# Patient Record
Sex: MALE | Race: WHITE | NOT HISPANIC OR LATINO | Employment: FULL TIME | URBAN - METROPOLITAN AREA
[De-identification: names, ages, dates, MRNs, and addresses within clinical notes are randomized per-mention and may not be internally consistent; named-entity substitution may affect disease eponyms.]

---

## 2020-02-13 ENCOUNTER — APPOINTMENT (EMERGENCY)
Dept: RADIOLOGY | Facility: HOSPITAL | Age: 45
DRG: 494 | End: 2020-02-13
Payer: COMMERCIAL

## 2020-02-13 ENCOUNTER — APPOINTMENT (EMERGENCY)
Dept: CT IMAGING | Facility: HOSPITAL | Age: 45
DRG: 494 | End: 2020-02-13
Payer: COMMERCIAL

## 2020-02-13 ENCOUNTER — HOSPITAL ENCOUNTER (INPATIENT)
Facility: HOSPITAL | Age: 45
LOS: 4 days | Discharge: HOME | DRG: 494 | End: 2020-02-17
Attending: EMERGENCY MEDICINE | Admitting: INTERNAL MEDICINE
Payer: COMMERCIAL

## 2020-02-13 DIAGNOSIS — S82.202A CLOSED FRACTURE OF LEFT TIBIA AND FIBULA, INITIAL ENCOUNTER: ICD-10-CM

## 2020-02-13 DIAGNOSIS — S82.402A CLOSED FRACTURE OF LEFT TIBIA AND FIBULA, INITIAL ENCOUNTER: ICD-10-CM

## 2020-02-13 DIAGNOSIS — R52 INTRACTABLE PAIN: ICD-10-CM

## 2020-02-13 DIAGNOSIS — S82.202A CLOSED FRACTURE OF SHAFT OF TIBIA AND FIBULA, LEFT, INITIAL ENCOUNTER: Primary | ICD-10-CM

## 2020-02-13 DIAGNOSIS — S82.402A CLOSED FRACTURE OF SHAFT OF TIBIA AND FIBULA, LEFT, INITIAL ENCOUNTER: Primary | ICD-10-CM

## 2020-02-13 PROBLEM — S82.401A CLOSED FRACTURE OF RIGHT TIBIA AND FIBULA: Status: ACTIVE | Noted: 2020-02-13

## 2020-02-13 PROBLEM — S82.201A CLOSED FRACTURE OF RIGHT TIBIA AND FIBULA: Status: ACTIVE | Noted: 2020-02-13

## 2020-02-13 PROBLEM — E03.9 HYPOTHYROIDISM: Status: ACTIVE | Noted: 2020-02-13

## 2020-02-13 PROBLEM — K21.9 GASTROESOPHAGEAL REFLUX DISEASE WITHOUT ESOPHAGITIS: Status: ACTIVE | Noted: 2020-02-13

## 2020-02-13 LAB
BASOPHILS # BLD AUTO: 0.03 THOUSANDS/ΜL (ref 0–0.1)
BASOPHILS NFR BLD AUTO: 0 % (ref 0–1)
EOSINOPHIL # BLD AUTO: 0.06 THOUSAND/ΜL (ref 0–0.61)
EOSINOPHIL NFR BLD AUTO: 1 % (ref 0–6)
ERYTHROCYTE [DISTWIDTH] IN BLOOD BY AUTOMATED COUNT: 14.8 % (ref 11.6–15.1)
HCT VFR BLD AUTO: 46.2 % (ref 36.5–49.3)
HGB BLD-MCNC: 15 G/DL (ref 12–17)
IMM GRANULOCYTES # BLD AUTO: 0.05 THOUSAND/UL (ref 0–0.2)
IMM GRANULOCYTES NFR BLD AUTO: 0 % (ref 0–2)
LYMPHOCYTES # BLD AUTO: 1.53 THOUSANDS/ΜL (ref 0.6–4.47)
LYMPHOCYTES NFR BLD AUTO: 12 % (ref 14–44)
MCH RBC QN AUTO: 28.4 PG (ref 26.8–34.3)
MCHC RBC AUTO-ENTMCNC: 32.5 G/DL (ref 31.4–37.4)
MCV RBC AUTO: 87 FL (ref 82–98)
MONOCYTES # BLD AUTO: 0.77 THOUSAND/ΜL (ref 0.17–1.22)
MONOCYTES NFR BLD AUTO: 6 % (ref 4–12)
NEUTROPHILS # BLD AUTO: 10 THOUSANDS/ΜL (ref 1.85–7.62)
NEUTS SEG NFR BLD AUTO: 81 % (ref 43–75)
NRBC BLD AUTO-RTO: 0 /100 WBCS
PLATELET # BLD AUTO: 184 THOUSANDS/UL (ref 149–390)
PMV BLD AUTO: 11.7 FL (ref 8.9–12.7)
RBC # BLD AUTO: 5.29 MILLION/UL (ref 3.88–5.62)
WBC # BLD AUTO: 12.44 THOUSAND/UL (ref 4.31–10.16)

## 2020-02-13 PROCEDURE — 80048 BASIC METABOLIC PNL TOTAL CA: CPT | Performed by: EMERGENCY MEDICINE

## 2020-02-13 PROCEDURE — 86901 BLOOD TYPING SEROLOGIC RH(D): CPT | Performed by: EMERGENCY MEDICINE

## 2020-02-13 PROCEDURE — 84443 ASSAY THYROID STIM HORMONE: CPT | Performed by: INTERNAL MEDICINE

## 2020-02-13 PROCEDURE — 96374 THER/PROPH/DIAG INJ IV PUSH: CPT

## 2020-02-13 PROCEDURE — 99285 EMERGENCY DEPT VISIT HI MDM: CPT

## 2020-02-13 PROCEDURE — 99223 1ST HOSP IP/OBS HIGH 75: CPT | Performed by: INTERNAL MEDICINE

## 2020-02-13 PROCEDURE — 84439 ASSAY OF FREE THYROXINE: CPT | Performed by: INTERNAL MEDICINE

## 2020-02-13 PROCEDURE — 86850 RBC ANTIBODY SCREEN: CPT | Performed by: EMERGENCY MEDICINE

## 2020-02-13 PROCEDURE — 73700 CT LOWER EXTREMITY W/O DYE: CPT

## 2020-02-13 PROCEDURE — 29515 APPLICATION SHORT LEG SPLINT: CPT | Performed by: EMERGENCY MEDICINE

## 2020-02-13 PROCEDURE — 73590 X-RAY EXAM OF LOWER LEG: CPT

## 2020-02-13 PROCEDURE — 96375 TX/PRO/DX INJ NEW DRUG ADDON: CPT

## 2020-02-13 PROCEDURE — 96376 TX/PRO/DX INJ SAME DRUG ADON: CPT

## 2020-02-13 PROCEDURE — 86900 BLOOD TYPING SEROLOGIC ABO: CPT | Performed by: EMERGENCY MEDICINE

## 2020-02-13 PROCEDURE — 2W3RX1Z IMMOBILIZATION OF LEFT LOWER LEG USING SPLINT: ICD-10-PCS | Performed by: EMERGENCY MEDICINE

## 2020-02-13 PROCEDURE — 85025 COMPLETE CBC W/AUTO DIFF WBC: CPT | Performed by: EMERGENCY MEDICINE

## 2020-02-13 PROCEDURE — 99285 EMERGENCY DEPT VISIT HI MDM: CPT | Performed by: EMERGENCY MEDICINE

## 2020-02-13 PROCEDURE — 36415 COLL VENOUS BLD VENIPUNCTURE: CPT | Performed by: INTERNAL MEDICINE

## 2020-02-13 RX ORDER — HYDROMORPHONE HCL/PF 1 MG/ML
1 SYRINGE (ML) INJECTION ONCE
Status: COMPLETED | OUTPATIENT
Start: 2020-02-13 | End: 2020-02-13

## 2020-02-13 RX ORDER — ACETAMINOPHEN 325 MG/1
975 TABLET ORAL EVERY 8 HOURS SCHEDULED
Status: DISCONTINUED | OUTPATIENT
Start: 2020-02-13 | End: 2020-02-17 | Stop reason: HOSPADM

## 2020-02-13 RX ORDER — FENTANYL CITRATE 50 UG/ML
1 INJECTION, SOLUTION INTRAMUSCULAR; INTRAVENOUS ONCE
Status: COMPLETED | OUTPATIENT
Start: 2020-02-13 | End: 2020-02-13

## 2020-02-13 RX ORDER — LORAZEPAM 2 MG/ML
1 INJECTION INTRAMUSCULAR ONCE
Status: COMPLETED | OUTPATIENT
Start: 2020-02-13 | End: 2020-02-13

## 2020-02-13 RX ORDER — KETOROLAC TROMETHAMINE 30 MG/ML
30 INJECTION, SOLUTION INTRAMUSCULAR; INTRAVENOUS ONCE
Status: COMPLETED | OUTPATIENT
Start: 2020-02-13 | End: 2020-02-13

## 2020-02-13 RX ORDER — ASPIRIN 81 MG/1
81 TABLET ORAL DAILY
COMMUNITY
End: 2020-02-17 | Stop reason: HOSPADM

## 2020-02-13 RX ORDER — OMEPRAZOLE 20 MG/1
20 CAPSULE, DELAYED RELEASE ORAL DAILY
COMMUNITY

## 2020-02-13 RX ORDER — OXYCODONE HYDROCHLORIDE 5 MG/1
5 TABLET ORAL EVERY 4 HOURS PRN
Status: DISCONTINUED | OUTPATIENT
Start: 2020-02-13 | End: 2020-02-15

## 2020-02-13 RX ORDER — LEVOTHYROXINE SODIUM 0.05 MG/1
50 TABLET ORAL DAILY
COMMUNITY

## 2020-02-13 RX ORDER — OXYCODONE HYDROCHLORIDE 10 MG/1
10 TABLET ORAL EVERY 4 HOURS PRN
Status: DISCONTINUED | OUTPATIENT
Start: 2020-02-13 | End: 2020-02-15

## 2020-02-13 RX ORDER — HYDROMORPHONE HCL/PF 1 MG/ML
0.5 SYRINGE (ML) INJECTION
Status: DISCONTINUED | OUTPATIENT
Start: 2020-02-13 | End: 2020-02-15

## 2020-02-13 RX ADMIN — LORAZEPAM 1 MG: 2 INJECTION INTRAMUSCULAR; INTRAVENOUS at 22:10

## 2020-02-13 RX ADMIN — HYDROMORPHONE HYDROCHLORIDE 1 MG: 1 INJECTION, SOLUTION INTRAMUSCULAR; INTRAVENOUS; SUBCUTANEOUS at 21:46

## 2020-02-13 RX ADMIN — HYDROMORPHONE HYDROCHLORIDE 1 MG: 1 INJECTION, SOLUTION INTRAMUSCULAR; INTRAVENOUS; SUBCUTANEOUS at 21:11

## 2020-02-13 RX ADMIN — HYDROMORPHONE HYDROCHLORIDE 1 MG: 1 INJECTION, SOLUTION INTRAMUSCULAR; INTRAVENOUS; SUBCUTANEOUS at 22:23

## 2020-02-13 RX ADMIN — ACETAMINOPHEN 975 MG: 325 TABLET, FILM COATED ORAL at 23:25

## 2020-02-13 RX ADMIN — KETOROLAC TROMETHAMINE 30 MG: 30 INJECTION, SOLUTION INTRAMUSCULAR at 21:45

## 2020-02-13 NOTE — LETTER
[address] [address]  Dept: [phone]    February 16, 2020     Patient: Kian Cabrera   YOB: 1975   Date of Visit: 2/13/2020       To Whom it May Concern:    Bianca is under my professional care  He was seen in the hospital from 2/13/2020   to 02/17/20  He was accompanied by his wife, Magi Mireles, during this hospital stay due to severity of his injury, requiring additional help for care/treatment  If you have any questions or concerns, please don't hesitate to call           Sincerely,          Charis Fulton PA-C

## 2020-02-14 ENCOUNTER — ANESTHESIA EVENT (INPATIENT)
Dept: PERIOP | Facility: HOSPITAL | Age: 45
DRG: 494 | End: 2020-02-14
Payer: COMMERCIAL

## 2020-02-14 ENCOUNTER — ANESTHESIA (INPATIENT)
Dept: PERIOP | Facility: HOSPITAL | Age: 45
DRG: 494 | End: 2020-02-14
Payer: COMMERCIAL

## 2020-02-14 ENCOUNTER — APPOINTMENT (INPATIENT)
Dept: RADIOLOGY | Facility: HOSPITAL | Age: 45
DRG: 494 | End: 2020-02-14
Payer: COMMERCIAL

## 2020-02-14 PROBLEM — S82.202A: Status: ACTIVE | Noted: 2020-02-13

## 2020-02-14 PROBLEM — S82.402A: Status: ACTIVE | Noted: 2020-02-13

## 2020-02-14 PROBLEM — N18.30 STAGE 3 CHRONIC KIDNEY DISEASE (HCC): Status: ACTIVE | Noted: 2020-02-14

## 2020-02-14 LAB
ABO GROUP BLD: NORMAL
ABO GROUP BLD: NORMAL
ANION GAP SERPL CALCULATED.3IONS-SCNC: 3 MMOL/L (ref 4–13)
ANION GAP SERPL CALCULATED.3IONS-SCNC: 7 MMOL/L (ref 4–13)
BLD GP AB SCN SERPL QL: NEGATIVE
BUN SERPL-MCNC: 19 MG/DL (ref 5–25)
BUN SERPL-MCNC: 20 MG/DL (ref 5–25)
CALCIUM SERPL-MCNC: 8.6 MG/DL (ref 8.3–10.1)
CALCIUM SERPL-MCNC: 8.7 MG/DL (ref 8.3–10.1)
CHLORIDE SERPL-SCNC: 105 MMOL/L (ref 100–108)
CHLORIDE SERPL-SCNC: 108 MMOL/L (ref 100–108)
CO2 SERPL-SCNC: 31 MMOL/L (ref 21–32)
CO2 SERPL-SCNC: 32 MMOL/L (ref 21–32)
CREAT SERPL-MCNC: 1.62 MG/DL (ref 0.6–1.3)
CREAT SERPL-MCNC: 1.72 MG/DL (ref 0.6–1.3)
ERYTHROCYTE [DISTWIDTH] IN BLOOD BY AUTOMATED COUNT: 14.7 % (ref 11.6–15.1)
GFR SERPL CREATININE-BSD FRML MDRD: 47 ML/MIN/1.73SQ M
GFR SERPL CREATININE-BSD FRML MDRD: 50 ML/MIN/1.73SQ M
GLUCOSE SERPL-MCNC: 107 MG/DL (ref 65–140)
GLUCOSE SERPL-MCNC: 95 MG/DL (ref 65–140)
HCT VFR BLD AUTO: 43.7 % (ref 36.5–49.3)
HGB BLD-MCNC: 14 G/DL (ref 12–17)
MCH RBC QN AUTO: 28.3 PG (ref 26.8–34.3)
MCHC RBC AUTO-ENTMCNC: 32 G/DL (ref 31.4–37.4)
MCV RBC AUTO: 88 FL (ref 82–98)
PLATELET # BLD AUTO: 167 THOUSANDS/UL (ref 149–390)
PMV BLD AUTO: 11.9 FL (ref 8.9–12.7)
POTASSIUM SERPL-SCNC: 4.1 MMOL/L (ref 3.5–5.3)
POTASSIUM SERPL-SCNC: 4.2 MMOL/L (ref 3.5–5.3)
RBC # BLD AUTO: 4.95 MILLION/UL (ref 3.88–5.62)
RH BLD: POSITIVE
RH BLD: POSITIVE
SODIUM SERPL-SCNC: 143 MMOL/L (ref 136–145)
SODIUM SERPL-SCNC: 143 MMOL/L (ref 136–145)
SPECIMEN EXPIRATION DATE: NORMAL
T4 FREE SERPL-MCNC: 1.1 NG/DL (ref 0.76–1.46)
TSH SERPL DL<=0.05 MIU/L-ACNC: 6.11 UIU/ML (ref 0.36–3.74)
WBC # BLD AUTO: 9.82 THOUSAND/UL (ref 4.31–10.16)

## 2020-02-14 PROCEDURE — C1713 ANCHOR/SCREW BN/BN,TIS/BN: HCPCS | Performed by: ORTHOPAEDIC SURGERY

## 2020-02-14 PROCEDURE — 85027 COMPLETE CBC AUTOMATED: CPT | Performed by: INTERNAL MEDICINE

## 2020-02-14 PROCEDURE — 73590 X-RAY EXAM OF LOWER LEG: CPT

## 2020-02-14 PROCEDURE — 0QSH06Z REPOSITION LEFT TIBIA WITH INTRAMEDULLARY INTERNAL FIXATION DEVICE, OPEN APPROACH: ICD-10-PCS | Performed by: ORTHOPAEDIC SURGERY

## 2020-02-14 PROCEDURE — 27784 TREATMENT OF FIBULA FRACTURE: CPT | Performed by: ORTHOPAEDIC SURGERY

## 2020-02-14 PROCEDURE — 99255 IP/OBS CONSLTJ NEW/EST HI 80: CPT | Performed by: ORTHOPAEDIC SURGERY

## 2020-02-14 PROCEDURE — 27784 TREATMENT OF FIBULA FRACTURE: CPT | Performed by: PHYSICIAN ASSISTANT

## 2020-02-14 PROCEDURE — 27759 TREATMENT OF TIBIA FRACTURE: CPT | Performed by: PHYSICIAN ASSISTANT

## 2020-02-14 PROCEDURE — 36415 COLL VENOUS BLD VENIPUNCTURE: CPT | Performed by: INTERNAL MEDICINE

## 2020-02-14 PROCEDURE — 27759 TREATMENT OF TIBIA FRACTURE: CPT | Performed by: ORTHOPAEDIC SURGERY

## 2020-02-14 PROCEDURE — C1769 GUIDE WIRE: HCPCS | Performed by: ORTHOPAEDIC SURGERY

## 2020-02-14 PROCEDURE — 80048 BASIC METABOLIC PNL TOTAL CA: CPT | Performed by: INTERNAL MEDICINE

## 2020-02-14 PROCEDURE — 99232 SBSQ HOSP IP/OBS MODERATE 35: CPT | Performed by: PHYSICIAN ASSISTANT

## 2020-02-14 DEVICE — 4.0MM TI LOCKING SCREW W/T25 STARDRIVE 38MM F/IM NAILS-STER: Type: IMPLANTABLE DEVICE | Site: TIBIA | Status: FUNCTIONAL

## 2020-02-14 DEVICE — 4.0MM TI LOCKING SCREW W/T25 STARDRIVE 40MM F/IM NAILS-STER: Type: IMPLANTABLE DEVICE | Site: TIBIA | Status: FUNCTIONAL

## 2020-02-14 DEVICE — 9MM TI CANN TIBIAL NAIL-EX W/PROX BEND 345MM-STERILE
Type: IMPLANTABLE DEVICE | Site: TIBIA | Status: FUNCTIONAL
Brand: EXPERT NAIL

## 2020-02-14 DEVICE — 3.5MM CORTEX SCREW SELF-TAPPING 12MM: Type: IMPLANTABLE DEVICE | Site: FIBULA | Status: FUNCTIONAL

## 2020-02-14 DEVICE — 3.5MM CORTEX SCREW SELF-TAPPING 16MM: Type: IMPLANTABLE DEVICE | Site: FIBULA | Status: FUNCTIONAL

## 2020-02-14 DEVICE — 4.0MM TI LOCKING SCREW W/T25 STARDRIVE 44MM F/IM NAILS-STER: Type: IMPLANTABLE DEVICE | Site: TIBIA | Status: FUNCTIONAL

## 2020-02-14 DEVICE — 4.0MM TI LOCKING SCREW W/T25 STARDRIVE 36MM F/IM NAILS-STER: Type: IMPLANTABLE DEVICE | Site: TIBIA | Status: FUNCTIONAL

## 2020-02-14 DEVICE — LCP ONE-THIRD TUBULAR PLATE WITH COLLAR 7 HOLES/81MM
Type: IMPLANTABLE DEVICE | Site: FIBULA | Status: FUNCTIONAL
Brand: LCP

## 2020-02-14 DEVICE — 3.5MM CORTEX SCREW SELF-TAPPING 14MM: Type: IMPLANTABLE DEVICE | Site: FIBULA | Status: FUNCTIONAL

## 2020-02-14 RX ORDER — FENTANYL CITRATE 50 UG/ML
50 INJECTION, SOLUTION INTRAMUSCULAR; INTRAVENOUS
Status: DISCONTINUED | OUTPATIENT
Start: 2020-02-14 | End: 2020-02-14 | Stop reason: HOSPADM

## 2020-02-14 RX ORDER — ACETAMINOPHEN 325 MG/1
975 TABLET ORAL ONCE
Status: COMPLETED | OUTPATIENT
Start: 2020-02-14 | End: 2020-02-14

## 2020-02-14 RX ORDER — CEFAZOLIN SODIUM 2 G/50ML
SOLUTION INTRAVENOUS AS NEEDED
Status: DISCONTINUED | OUTPATIENT
Start: 2020-02-14 | End: 2020-02-14 | Stop reason: SURG

## 2020-02-14 RX ORDER — MAGNESIUM HYDROXIDE 1200 MG/15ML
LIQUID ORAL AS NEEDED
Status: DISCONTINUED | OUTPATIENT
Start: 2020-02-14 | End: 2020-02-14 | Stop reason: HOSPADM

## 2020-02-14 RX ORDER — HYDROMORPHONE HCL/PF 1 MG/ML
0.5 SYRINGE (ML) INJECTION
Status: COMPLETED | OUTPATIENT
Start: 2020-02-14 | End: 2020-02-14

## 2020-02-14 RX ORDER — GLYCOPYRROLATE 0.2 MG/ML
INJECTION INTRAMUSCULAR; INTRAVENOUS AS NEEDED
Status: DISCONTINUED | OUTPATIENT
Start: 2020-02-14 | End: 2020-02-14 | Stop reason: SURG

## 2020-02-14 RX ORDER — NEOSTIGMINE METHYLSULFATE 1 MG/ML
INJECTION INTRAVENOUS AS NEEDED
Status: DISCONTINUED | OUTPATIENT
Start: 2020-02-14 | End: 2020-02-14 | Stop reason: SURG

## 2020-02-14 RX ORDER — ROCURONIUM BROMIDE 10 MG/ML
INJECTION, SOLUTION INTRAVENOUS AS NEEDED
Status: DISCONTINUED | OUTPATIENT
Start: 2020-02-14 | End: 2020-02-14 | Stop reason: SURG

## 2020-02-14 RX ORDER — METOCLOPRAMIDE HYDROCHLORIDE 5 MG/ML
10 INJECTION INTRAMUSCULAR; INTRAVENOUS ONCE AS NEEDED
Status: DISCONTINUED | OUTPATIENT
Start: 2020-02-14 | End: 2020-02-14 | Stop reason: HOSPADM

## 2020-02-14 RX ORDER — HYDROMORPHONE HCL 110MG/55ML
PATIENT CONTROLLED ANALGESIA SYRINGE INTRAVENOUS AS NEEDED
Status: DISCONTINUED | OUTPATIENT
Start: 2020-02-14 | End: 2020-02-14 | Stop reason: SURG

## 2020-02-14 RX ORDER — LIDOCAINE HYDROCHLORIDE AND EPINEPHRINE 10; 10 MG/ML; UG/ML
INJECTION, SOLUTION INFILTRATION; PERINEURAL AS NEEDED
Status: DISCONTINUED | OUTPATIENT
Start: 2020-02-14 | End: 2020-02-14 | Stop reason: HOSPADM

## 2020-02-14 RX ORDER — BUPIVACAINE HYDROCHLORIDE 2.5 MG/ML
INJECTION, SOLUTION EPIDURAL; INFILTRATION; INTRACAUDAL AS NEEDED
Status: DISCONTINUED | OUTPATIENT
Start: 2020-02-14 | End: 2020-02-14 | Stop reason: HOSPADM

## 2020-02-14 RX ORDER — CEFAZOLIN SODIUM 2 G/50ML
2000 SOLUTION INTRAVENOUS EVERY 8 HOURS
Status: COMPLETED | OUTPATIENT
Start: 2020-02-14 | End: 2020-02-15

## 2020-02-14 RX ORDER — LEVOTHYROXINE SODIUM 0.05 MG/1
50 TABLET ORAL
Status: DISCONTINUED | OUTPATIENT
Start: 2020-02-14 | End: 2020-02-17 | Stop reason: HOSPADM

## 2020-02-14 RX ORDER — PROPOFOL 10 MG/ML
INJECTION, EMULSION INTRAVENOUS AS NEEDED
Status: DISCONTINUED | OUTPATIENT
Start: 2020-02-14 | End: 2020-02-14 | Stop reason: SURG

## 2020-02-14 RX ORDER — MAGNESIUM HYDROXIDE/ALUMINUM HYDROXICE/SIMETHICONE 120; 1200; 1200 MG/30ML; MG/30ML; MG/30ML
30 SUSPENSION ORAL EVERY 6 HOURS PRN
Status: DISCONTINUED | OUTPATIENT
Start: 2020-02-14 | End: 2020-02-17 | Stop reason: HOSPADM

## 2020-02-14 RX ORDER — ONDANSETRON 2 MG/ML
4 INJECTION INTRAMUSCULAR; INTRAVENOUS EVERY 6 HOURS PRN
Status: DISCONTINUED | OUTPATIENT
Start: 2020-02-14 | End: 2020-02-17 | Stop reason: HOSPADM

## 2020-02-14 RX ORDER — HYDROMORPHONE HCL 110MG/55ML
0.5 PATIENT CONTROLLED ANALGESIA SYRINGE INTRAVENOUS
Status: ACTIVE | OUTPATIENT
Start: 2020-02-14 | End: 2020-02-14

## 2020-02-14 RX ORDER — DEXAMETHASONE SODIUM PHOSPHATE 10 MG/ML
INJECTION, SOLUTION INTRAMUSCULAR; INTRAVENOUS AS NEEDED
Status: DISCONTINUED | OUTPATIENT
Start: 2020-02-14 | End: 2020-02-14 | Stop reason: SURG

## 2020-02-14 RX ORDER — ASPIRIN 325 MG
325 TABLET ORAL DAILY
Status: DISCONTINUED | OUTPATIENT
Start: 2020-02-15 | End: 2020-02-15

## 2020-02-14 RX ORDER — DEXMEDETOMIDINE HYDROCHLORIDE 100 UG/ML
INJECTION, SOLUTION INTRAVENOUS AS NEEDED
Status: DISCONTINUED | OUTPATIENT
Start: 2020-02-14 | End: 2020-02-14 | Stop reason: SURG

## 2020-02-14 RX ORDER — SODIUM CHLORIDE, SODIUM LACTATE, POTASSIUM CHLORIDE, CALCIUM CHLORIDE 600; 310; 30; 20 MG/100ML; MG/100ML; MG/100ML; MG/100ML
125 INJECTION, SOLUTION INTRAVENOUS CONTINUOUS
Status: DISCONTINUED | OUTPATIENT
Start: 2020-02-14 | End: 2020-02-15

## 2020-02-14 RX ORDER — SODIUM CHLORIDE, SODIUM LACTATE, POTASSIUM CHLORIDE, CALCIUM CHLORIDE 600; 310; 30; 20 MG/100ML; MG/100ML; MG/100ML; MG/100ML
125 INJECTION, SOLUTION INTRAVENOUS CONTINUOUS
Status: DISCONTINUED | OUTPATIENT
Start: 2020-02-14 | End: 2020-02-14

## 2020-02-14 RX ORDER — CEFAZOLIN SODIUM 2 G/50ML
2000 SOLUTION INTRAVENOUS ONCE
Status: DISCONTINUED | OUTPATIENT
Start: 2020-02-14 | End: 2020-02-14 | Stop reason: SDUPTHER

## 2020-02-14 RX ORDER — ONDANSETRON 2 MG/ML
4 INJECTION INTRAMUSCULAR; INTRAVENOUS ONCE AS NEEDED
Status: DISCONTINUED | OUTPATIENT
Start: 2020-02-14 | End: 2020-02-14 | Stop reason: HOSPADM

## 2020-02-14 RX ORDER — PANTOPRAZOLE SODIUM 40 MG/1
40 TABLET, DELAYED RELEASE ORAL
Status: DISCONTINUED | OUTPATIENT
Start: 2020-02-14 | End: 2020-02-17 | Stop reason: HOSPADM

## 2020-02-14 RX ORDER — ONDANSETRON 2 MG/ML
INJECTION INTRAMUSCULAR; INTRAVENOUS AS NEEDED
Status: DISCONTINUED | OUTPATIENT
Start: 2020-02-14 | End: 2020-02-14 | Stop reason: SURG

## 2020-02-14 RX ORDER — GABAPENTIN 100 MG/1
100 CAPSULE ORAL EVERY 8 HOURS
Status: DISCONTINUED | OUTPATIENT
Start: 2020-02-14 | End: 2020-02-15

## 2020-02-14 RX ORDER — LIDOCAINE HYDROCHLORIDE 10 MG/ML
INJECTION, SOLUTION EPIDURAL; INFILTRATION; INTRACAUDAL; PERINEURAL AS NEEDED
Status: DISCONTINUED | OUTPATIENT
Start: 2020-02-14 | End: 2020-02-14 | Stop reason: SURG

## 2020-02-14 RX ORDER — KETAMINE HYDROCHLORIDE 50 MG/ML
INJECTION, SOLUTION, CONCENTRATE INTRAMUSCULAR; INTRAVENOUS AS NEEDED
Status: DISCONTINUED | OUTPATIENT
Start: 2020-02-14 | End: 2020-02-14 | Stop reason: SURG

## 2020-02-14 RX ORDER — MIDAZOLAM HYDROCHLORIDE 2 MG/2ML
INJECTION, SOLUTION INTRAMUSCULAR; INTRAVENOUS AS NEEDED
Status: DISCONTINUED | OUTPATIENT
Start: 2020-02-14 | End: 2020-02-14 | Stop reason: SURG

## 2020-02-14 RX ORDER — FENTANYL CITRATE 50 UG/ML
INJECTION, SOLUTION INTRAMUSCULAR; INTRAVENOUS AS NEEDED
Status: DISCONTINUED | OUTPATIENT
Start: 2020-02-14 | End: 2020-02-14 | Stop reason: SURG

## 2020-02-14 RX ADMIN — LEVOTHYROXINE SODIUM 50 MCG: 50 TABLET ORAL at 06:01

## 2020-02-14 RX ADMIN — HYDROMORPHONE HYDROCHLORIDE 0.5 MG: 1 INJECTION, SOLUTION INTRAMUSCULAR; INTRAVENOUS; SUBCUTANEOUS at 09:20

## 2020-02-14 RX ADMIN — CEFAZOLIN SODIUM 2000 MG: 2 SOLUTION INTRAVENOUS at 14:33

## 2020-02-14 RX ADMIN — HYDROMORPHONE HYDROCHLORIDE 0.5 MG: 1 INJECTION, SOLUTION INTRAMUSCULAR; INTRAVENOUS; SUBCUTANEOUS at 11:14

## 2020-02-14 RX ADMIN — HYDROMORPHONE HYDROCHLORIDE 0.5 MG: 1 INJECTION, SOLUTION INTRAMUSCULAR; INTRAVENOUS; SUBCUTANEOUS at 01:02

## 2020-02-14 RX ADMIN — CEFAZOLIN SODIUM 2000 MG: 2 SOLUTION INTRAVENOUS at 18:24

## 2020-02-14 RX ADMIN — OXYCODONE HYDROCHLORIDE 10 MG: 10 TABLET ORAL at 04:05

## 2020-02-14 RX ADMIN — FENTANYL CITRATE 50 MCG: 50 INJECTION, SOLUTION INTRAMUSCULAR; INTRAVENOUS at 20:33

## 2020-02-14 RX ADMIN — HYDROMORPHONE HYDROCHLORIDE 0.5 MG: 2 INJECTION, SOLUTION INTRAMUSCULAR; INTRAVENOUS; SUBCUTANEOUS at 18:11

## 2020-02-14 RX ADMIN — PHENYLEPHRINE HYDROCHLORIDE 100 MCG: 10 INJECTION INTRAVENOUS at 17:37

## 2020-02-14 RX ADMIN — ROCURONIUM BROMIDE 50 MG: 10 SOLUTION INTRAVENOUS at 14:31

## 2020-02-14 RX ADMIN — ACETAMINOPHEN 975 MG: 325 TABLET, FILM COATED ORAL at 07:54

## 2020-02-14 RX ADMIN — OXYCODONE HYDROCHLORIDE 5 MG: 5 TABLET ORAL at 11:10

## 2020-02-14 RX ADMIN — GABAPENTIN 100 MG: 100 CAPSULE ORAL at 21:42

## 2020-02-14 RX ADMIN — PHENYLEPHRINE HYDROCHLORIDE 100 MCG: 10 INJECTION INTRAVENOUS at 16:19

## 2020-02-14 RX ADMIN — HYDROMORPHONE HYDROCHLORIDE 0.5 MG: 1 INJECTION, SOLUTION INTRAMUSCULAR; INTRAVENOUS; SUBCUTANEOUS at 22:56

## 2020-02-14 RX ADMIN — SODIUM CHLORIDE, SODIUM LACTATE, POTASSIUM CHLORIDE, AND CALCIUM CHLORIDE: .6; .31; .03; .02 INJECTION, SOLUTION INTRAVENOUS at 15:44

## 2020-02-14 RX ADMIN — PHENYLEPHRINE HYDROCHLORIDE 100 MCG: 10 INJECTION INTRAVENOUS at 17:09

## 2020-02-14 RX ADMIN — PHENYLEPHRINE HYDROCHLORIDE 100 MCG: 10 INJECTION INTRAVENOUS at 17:23

## 2020-02-14 RX ADMIN — KETAMINE HYDROCHLORIDE 15 MG: 50 INJECTION INTRAMUSCULAR; INTRAVENOUS at 16:14

## 2020-02-14 RX ADMIN — SODIUM CHLORIDE, SODIUM LACTATE, POTASSIUM CHLORIDE, AND CALCIUM CHLORIDE: .6; .31; .03; .02 INJECTION, SOLUTION INTRAVENOUS at 17:38

## 2020-02-14 RX ADMIN — PHENYLEPHRINE HYDROCHLORIDE 150 MCG: 10 INJECTION INTRAVENOUS at 17:35

## 2020-02-14 RX ADMIN — DEXMEDETOMIDINE HCL 10 MCG: 100 INJECTION INTRAVENOUS at 16:11

## 2020-02-14 RX ADMIN — HYDROMORPHONE HYDROCHLORIDE 0.5 MG: 1 INJECTION, SOLUTION INTRAMUSCULAR; INTRAVENOUS; SUBCUTANEOUS at 19:35

## 2020-02-14 RX ADMIN — MIDAZOLAM HYDROCHLORIDE 2 MG: 1 INJECTION, SOLUTION INTRAMUSCULAR; INTRAVENOUS at 14:27

## 2020-02-14 RX ADMIN — OXYCODONE HYDROCHLORIDE 10 MG: 10 TABLET ORAL at 08:42

## 2020-02-14 RX ADMIN — PANTOPRAZOLE SODIUM 40 MG: 40 TABLET, DELAYED RELEASE ORAL at 06:01

## 2020-02-14 RX ADMIN — NEOSTIGMINE METHYLSULFATE 3 MG: 1 INJECTION, SOLUTION INTRAVENOUS at 18:40

## 2020-02-14 RX ADMIN — FENTANYL CITRATE 50 MCG: 50 INJECTION, SOLUTION INTRAMUSCULAR; INTRAVENOUS at 17:30

## 2020-02-14 RX ADMIN — DEXMEDETOMIDINE HCL 10 MCG: 100 INJECTION INTRAVENOUS at 17:30

## 2020-02-14 RX ADMIN — GLYCOPYRROLATE 0.4 MG: 0.2 INJECTION, SOLUTION INTRAMUSCULAR; INTRAVENOUS at 18:40

## 2020-02-14 RX ADMIN — ONDANSETRON 4 MG: 2 INJECTION INTRAMUSCULAR; INTRAVENOUS at 17:49

## 2020-02-14 RX ADMIN — HYDROMORPHONE HYDROCHLORIDE 0.5 MG: 1 INJECTION, SOLUTION INTRAMUSCULAR; INTRAVENOUS; SUBCUTANEOUS at 19:55

## 2020-02-14 RX ADMIN — HYDROMORPHONE HYDROCHLORIDE 0.5 MG: 2 INJECTION, SOLUTION INTRAMUSCULAR; INTRAVENOUS; SUBCUTANEOUS at 19:17

## 2020-02-14 RX ADMIN — PROPOFOL 300 MG: 10 INJECTION, EMULSION INTRAVENOUS at 14:31

## 2020-02-14 RX ADMIN — KETAMINE HYDROCHLORIDE 5 MG: 50 INJECTION INTRAMUSCULAR; INTRAVENOUS at 17:24

## 2020-02-14 RX ADMIN — PHENYLEPHRINE HYDROCHLORIDE 50 MCG: 10 INJECTION INTRAVENOUS at 15:05

## 2020-02-14 RX ADMIN — HYDROMORPHONE HYDROCHLORIDE 0.5 MG: 1 INJECTION, SOLUTION INTRAMUSCULAR; INTRAVENOUS; SUBCUTANEOUS at 19:40

## 2020-02-14 RX ADMIN — FENTANYL CITRATE 50 MCG: 50 INJECTION, SOLUTION INTRAMUSCULAR; INTRAVENOUS at 20:22

## 2020-02-14 RX ADMIN — OXYCODONE HYDROCHLORIDE 5 MG: 5 TABLET ORAL at 01:25

## 2020-02-14 RX ADMIN — HYDROMORPHONE HYDROCHLORIDE 0.5 MG: 1 INJECTION, SOLUTION INTRAMUSCULAR; INTRAVENOUS; SUBCUTANEOUS at 05:58

## 2020-02-14 RX ADMIN — FENTANYL CITRATE 50 MCG: 50 INJECTION, SOLUTION INTRAMUSCULAR; INTRAVENOUS at 20:26

## 2020-02-14 RX ADMIN — SODIUM CHLORIDE, SODIUM LACTATE, POTASSIUM CHLORIDE, AND CALCIUM CHLORIDE 125 ML/HR: .6; .31; .03; .02 INJECTION, SOLUTION INTRAVENOUS at 20:25

## 2020-02-14 RX ADMIN — HYDROMORPHONE HYDROCHLORIDE 0.5 MG: 1 INJECTION, SOLUTION INTRAMUSCULAR; INTRAVENOUS; SUBCUTANEOUS at 19:46

## 2020-02-14 RX ADMIN — SODIUM CHLORIDE, SODIUM LACTATE, POTASSIUM CHLORIDE, AND CALCIUM CHLORIDE 125 ML/HR: .6; .31; .03; .02 INJECTION, SOLUTION INTRAVENOUS at 04:12

## 2020-02-14 RX ADMIN — OXYCODONE HYDROCHLORIDE 5 MG: 5 TABLET ORAL at 06:01

## 2020-02-14 RX ADMIN — PHENYLEPHRINE HYDROCHLORIDE 100 MCG: 10 INJECTION INTRAVENOUS at 16:32

## 2020-02-14 RX ADMIN — HYDROMORPHONE HYDROCHLORIDE 0.5 MG: 2 INJECTION, SOLUTION INTRAMUSCULAR; INTRAVENOUS; SUBCUTANEOUS at 19:05

## 2020-02-14 RX ADMIN — LIDOCAINE HYDROCHLORIDE 100 MG: 10 INJECTION, SOLUTION EPIDURAL; INFILTRATION; INTRACAUDAL; PERINEURAL at 14:31

## 2020-02-14 RX ADMIN — ACETAMINOPHEN 975 MG: 325 TABLET, FILM COATED ORAL at 20:23

## 2020-02-14 RX ADMIN — OXYCODONE HYDROCHLORIDE 10 MG: 10 TABLET ORAL at 21:46

## 2020-02-14 RX ADMIN — PROPOFOL 100 MG: 10 INJECTION, EMULSION INTRAVENOUS at 14:32

## 2020-02-14 RX ADMIN — HYDROMORPHONE HYDROCHLORIDE 0.5 MG: 2 INJECTION, SOLUTION INTRAMUSCULAR; INTRAVENOUS; SUBCUTANEOUS at 19:22

## 2020-02-14 RX ADMIN — DEXMEDETOMIDINE HCL 10 MCG: 100 INJECTION INTRAVENOUS at 16:14

## 2020-02-14 RX ADMIN — KETAMINE HYDROCHLORIDE 20 MG: 50 INJECTION INTRAMUSCULAR; INTRAVENOUS at 14:29

## 2020-02-14 RX ADMIN — FENTANYL CITRATE 50 MCG: 50 INJECTION, SOLUTION INTRAMUSCULAR; INTRAVENOUS at 14:30

## 2020-02-14 RX ADMIN — HYDROMORPHONE HYDROCHLORIDE 0.5 MG: 1 INJECTION, SOLUTION INTRAMUSCULAR; INTRAVENOUS; SUBCUTANEOUS at 03:47

## 2020-02-14 RX ADMIN — KETAMINE HYDROCHLORIDE 10 MG: 50 INJECTION INTRAMUSCULAR; INTRAVENOUS at 17:30

## 2020-02-14 RX ADMIN — PHENYLEPHRINE HYDROCHLORIDE 100 MCG: 10 INJECTION INTRAVENOUS at 16:55

## 2020-02-14 RX ADMIN — PHENYLEPHRINE HYDROCHLORIDE 100 MCG: 10 INJECTION INTRAVENOUS at 18:14

## 2020-02-14 RX ADMIN — DEXMEDETOMIDINE HCL 10 MCG: 100 INJECTION INTRAVENOUS at 19:06

## 2020-02-14 RX ADMIN — CEFAZOLIN SODIUM 2000 MG: 2 SOLUTION INTRAVENOUS at 21:43

## 2020-02-14 RX ADMIN — DEXMEDETOMIDINE HCL 10 MCG: 100 INJECTION INTRAVENOUS at 16:08

## 2020-02-14 RX ADMIN — DEXAMETHASONE SODIUM PHOSPHATE 4 MG: 10 INJECTION, SOLUTION INTRAMUSCULAR; INTRAVENOUS at 14:32

## 2020-02-14 NOTE — ED NOTES
1  CC left leg fracture  2  Is this admission r/t an injury? yes  3  Orientation status alert and oriented  4  Abnormal labs, assessment, vitals xray  5  Medication/ drips  6  Last time narcotics given 2223 dilaudid 1mg (3mg total), 2210 ativan 1mg  7  IV lines, drains, etc  20 left wrist  8  Isolation status none  9  Skin clear  10  Ambulation status not now  11   ED RN phone number [facility]  02/13/20 [phone]

## 2020-02-14 NOTE — ASSESSMENT & PLAN NOTE
· Fracture suffered while skiing, patient is incredibly active and underwent tib-fib surgery today with orthopedics  · Continue pain regimen  · Scheduled Tylenol  · P r n   Oxycodone and Dilaudid  · Apply ice to affected area  · Continue IV hydration  · Hold aspirin and heparin, resume postop  · Weightbearing status per ortho

## 2020-02-14 NOTE — CONSULTS
Orthopedics   Saba Webb 53 y o  male MRN: [id number]  Unit/Bed#: MO CT SCAN      Chief Complaint:   left leg pain    HPI:   53 y o  male lying in bed comfortable in no acute distress  Patient states he currently resides in [place] and works as a   He was up here on vacation skiing  On February 13, 2020 he was skiing at [facility] back when he lost his footing fell injuring his left leg  Patient was brought to the [facility] and admitted  X-rays were positive for comminuted distal tibia and fibular fracture  Patient denies any numbness or tingling left lower extremity  Pain has been controlled with narcotics given at the ER  Patient has no complaints of knee pain  Able to move all toes  Review Of Systems:   · Skin: Normal  · Neuro: See HPI  · Musculoskeletal: See HPI  · 14 point review of systems negative except as stated above     Past Medical History:   Past Medical History:   Diagnosis Date    Disease of thyroid gland     GERD (gastroesophageal reflux disease)        Past Surgical History:   Past Surgical History:   Procedure Laterality Date    ROTATOR CUFF REPAIR Right        Family History:  Family history reviewed and non-contributory  History reviewed  No pertinent family history      Social History:  Social History     Socioeconomic History    Marital status: /Civil Union     Spouse name: None    Number of children: None    Years of education: None    Highest education level: None   Occupational History    None   Social Needs    Financial resource strain: None    Food insecurity:     Worry: None     Inability: None    Transportation needs:     Medical: None     Non-medical: None   Tobacco Use    Smoking status: Never Smoker    Smokeless tobacco: Never Used   Substance and Sexual Activity    Alcohol use: Yes     Comment: social     Drug use: Never    Sexual activity: None   Lifestyle    Physical activity:     Days per week: None Minutes per session: None    Stress: None   Relationships    Social connections:     Talks on phone: None     Gets together: None     Attends Jewish service: None     Active member of club or organization: None     Attends meetings of clubs or organizations: None     Relationship status: None    Intimate partner violence:     Fear of current or ex partner: None     Emotionally abused: None     Physically abused: None     Forced sexual activity: None   Other Topics Concern    None   Social History Narrative    None       Allergies:   No Known Allergies        Labs:  0   Lab Value Date/Time    HCT 43 7 02/14/2020 0412    HCT 46 2 02/13/2020 2333    HGB 14 0 02/14/2020 0412    HGB 15 0 02/13/2020 2333    WBC 9 82 02/14/2020 0412    WBC 12 44 (H) 02/13/2020 2333       Meds:    Current Facility-Administered Medications:     acetaminophen (TYLENOL) tablet 975 mg, 975 mg, Oral, Q8H [facility], Gamal Corea, DO, 975 mg at 02/14/20 0754    HYDROmorphone (DILAUDID) injection 0 5 mg, 0 5 mg, Intravenous, Q1H PRN, Alicia Hansen DO, 0 5 mg at 02/14/20 0920    lactated ringers infusion, 125 mL/hr, Intravenous, Continuous, Alicia Hansen DO, Last Rate: 125 mL/hr at 02/14/20 0412, 125 mL/hr at 02/14/20 0412    levothyroxine tablet 50 mcg, 50 mcg, Oral, Early Morning, Gamal Easley Veres, DO, 50 mcg at 02/14/20 0601    oxyCODONE (ROXICODONE) immediate release tablet 10 mg, 10 mg, Oral, Q4H PRN, Gamal Easley Veres, DO, 10 mg at 02/14/20 0842    oxyCODONE (ROXICODONE) IR tablet 5 mg, 5 mg, Oral, Q4H PRN, Alicia Hansen DO, 5 mg at 02/14/20 0601    pantoprazole (PROTONIX) EC tablet 40 mg, 40 mg, Oral, Early Morning, Gamal Easley Veres, DO, 40 mg at 02/14/20 0601    Current Outpatient Medications:     aspirin (ECOTRIN LOW STRENGTH) 81 mg EC tablet, Take 81 mg by mouth daily, Disp: , Rfl:     levothyroxine 50 mcg tablet, Take 50 mcg by mouth daily, Disp: , Rfl:     omeprazole (PriLOSEC) 20 mg delayed release capsule, Take 20 mg by mouth daily, Disp: , Rfl:     Blood Culture:   No results found for: BLOODCX    Wound Culture:   No results found for: WOUNDCULT    Ins and Outs:  No intake/output data recorded  Physical Exam:   /82   Pulse 82   Temp 98 4 °F (36 9 °C) (Oral)   Resp 16   Wt 102 kg (225 lb)   SpO2 98%   Gen: Alert and oriented to person, place, time  HEENT: EOMI, eyes clear, moist mucus membranes, hearing intact  Respiratory: Bilateral chest rise  No audible wheezing found  Cardiovascular: Regular Rate and Rhythm  Abdomen: soft nontender/nondistended  Musculoskeletal: left lower extremity  · Skin intact  · Tender to palpation over mid distal tibial shaft and distal fibula  · Sensation is intact to light touch left lower extremity  · No pain with palpation left knee  · Patient able to move all toes      Radiology:   I personally reviewed the films  X-rays left tib/fib shows comminuted left tibial and fibula distal shaft fractures    CT left tibia shows comminuted and displaced fracture distal half of the tibia shaft with large butterfly fragments  Comminuted fractures of the distal fibular shaft     _*_*_*_*_*_*_*_*_*_*_*_*_*_*_*_*_*_*_*_*_*_*_*_*_*_*_*_*_*_*_*_*_*_*_*_*_*_*_*_*_*    Assessment:  53 y o  male status post fall from skiing accident with left tibial shaft fracture  Placed in a long leg splint with stirrups  Discussed ORIF left tibia and fibula with patient  Will be placed on the schedule today with doctor Flowers    Plan to do left tibial nail and possible ORIF left distal fibula    Plan:   · Non weight bearing left lower extremity in splint  · Analgesics for pain as needed  · Medical clearance for left lower leg surgery  · Pre op labs in ED  · NPO   · To OR for operative fixation of tibial shaft fracture when cleared   · Dispo: Ortho will follow after surgery      Julia Espinoza PA-C

## 2020-02-14 NOTE — ED NOTES
CC-  Closed fracture of shaft of tibia and fibula left     Admission related to injury?- yes      Orientation status- AOx4    Abnormal labs/abnormal focused assessment/vitals-     Medication/drips-     Last time narcotics given- 1114 0 5 dilaudid 1110 5mg oxycodone     IV lines/drains/etc- 20 LAC    Isolation status-     Skin-     BMAT screening tool-?     ED nurse's name and phone number-  Carlin Colindres [phone]     Sandra Knox, RN  02/14/20 3934

## 2020-02-14 NOTE — ASSESSMENT & PLAN NOTE
Creatinine may be falsely elevated due to high amount of muscle mass  Avoid nephrotoxins and hypotension  Ensure adequate oral intake and hydration  Monitor renal function, electrolytes

## 2020-02-14 NOTE — ANESTHESIA PREPROCEDURE EVALUATION
Review of Systems/Medical History  Patient summary reviewed  Chart reviewed  No history of anesthetic complications     Cardiovascular  Negative cardio ROS Exercise tolerance (METS): >4,     Pulmonary  Negative pulmonary ROS Not a smoker , No recent URI ,        GI/Hepatic    GERD well controlled,   Comment: Confirmed NPO appropriate       Comment: Mildly elevated Cr, patient admits to taking protein supplementation for fitness, denies other supplements     Endo/Other  History of thyroid disease (stable dose of synthroid for years) , hypothyroidism,      GYN       Hematology  Negative hematology ROS      Musculoskeletal  Negative musculoskeletal ROS        Neurology  Negative neurology ROS      Psychology   Negative psychology ROS              Physical Exam    Airway    Mallampati score: II  TM Distance: >3 FB  Neck ROM: full     Dental   No notable dental hx     Cardiovascular  Comment: Negative ROS, Rhythm: regular, Rate: normal, Cardiovascular exam normal    Pulmonary  Pulmonary exam normal Breath sounds clear to auscultation,     Other Findings        Anesthesia Plan  ASA Score- 2     Anesthesia Type- general with ASA Monitors  Additional Monitors:   Airway Plan: ETT  Comment: Plan for multimodal analgesia  Plan Factors-    Induction- intravenous  Postoperative Plan- Plan for postoperative opioid use  Planned trial extubation    Informed Consent- Anesthetic plan and risks discussed with patient and spouse  I personally reviewed this patient with the CRNA  Discussed and agreed on the Anesthesia Plan with the CRNA           Lab Results   Component Value Date    WBC 9 82 02/14/2020    HGB 14 0 02/14/2020    HCT 43 7 02/14/2020    MCV 88 02/14/2020     02/14/2020     Lab Results   Component Value Date    SODIUM 143 02/14/2020    K 4 1 02/14/2020     02/14/2020    CO2 32 02/14/2020    BUN 19 02/14/2020    CREATININE 1 72 (H) 02/14/2020    GLUC 95 02/14/2020    CALCIUM 8 6 02/14/2020 No results found for: ALT, AST, GGT, ALKPHOS, BILITOT  No results found for: INR, PROTIME  No results found for: HGBA1C

## 2020-02-14 NOTE — UTILIZATION REVIEW
Initial Clinical Review    Admission: Date/Time/Statement: Admission Orders (From admission, onward)     Ordered        02/13/20 2232  Inpatient Admission  Once                   Orders Placed This Encounter   Procedures    Inpatient Admission     Standing Status:   Standing     Number of Occurrences:   1     Order Specific Question:   Admitting Physician     Answer:   Jessee Dance [[id number]]     Order Specific Question:   Level of Care     Answer:   Med Surg [[id number]]     Order Specific Question:   Estimated length of stay     Answer:   More than 2 Midnights     Order Specific Question:   Certification     Answer:   I certify that inpatient services are medically necessary for this patient for a duration of greater than two midnights  See H&P and MD Progress Notes for additional information about the patient's course of treatment  ED Arrival Information     Expected Arrival Acuity Means of Arrival Escorted By Service Admission Type    - 2/13/2020 20:57 Urgent Ambulance [facility]) General Medicine Urgent    Arrival Complaint    -        Chief Complaint   Patient presents with    Leg Injury - Major     left leg ski injury,      Assessment/Plan: 52 yo male to ED from home w/ acute traumatic fracture of his left lower extremity  While skiing L ski got caught , ended up flipping over his ski boot and snapping his leg   In ED found to have acute fracture of his left tibia and fibula  Admitted IP status for orthopedic evaluation   Plan for OR at discretion of orthopedic team , consult , pain control , NPO , IVF , hold asa and heparin   Elevated creat may be falsely elevated d/t high amt of muscle mass , po intake and hydration , monitor renal function and Lytes  PE ; pt very muscular , L leg tenderness    2/14 Ortho consult   S/p skiing accident w/ L tibial shaft fx placed on long leg splint w/ stirrups  Plan for ORIF - pain control , NPO     ED Triage Vitals   Temperature Pulse Respirations Blood Pressure SpO2 02/13/20 2102 02/13/20 2102 02/13/20 2255 02/13/20 2102 02/13/20 2102   98 4 °F (36 9 °C) 101 16 125/86 96 %      Temp Source Heart Rate Source Patient Position - Orthostatic VS BP Location FiO2 (%)   02/13/20 2102 02/13/20 2328 02/13/20 2328 02/13/20 2328 --   Oral Monitor Sitting Right arm       Pain Score       02/13/20 2102       8        Wt Readings from Last 1 Encounters:   02/13/20 102 kg (225 lb)     Additional Vital Signs:   02/14/20 0925    82  16  127/82  98 %  None (Room air)     02/14/20 0604    76  16  124/69  98 %       02/14/20 0406    79  18  114/78  95 %       02/13/20 2328    93  18  136/74  96 %  None (Room air)  Sitting   02/13/20 2255      16             Pertinent Labs/Diagnostic Test Results:  2/13 L tib /fib Comminuted and displaced left tibial and fibular distal shaft fractures      2/13 CT LE     Comminuted and displaced fractures of the distal tibial and fibular shafts    Results from last 7 days   Lab Units 02/14/20 0412 02/13/20  2333   WBC Thousand/uL 9 82 12 44*   HEMOGLOBIN g/dL 14 0 15 0   HEMATOCRIT % 43 7 46 2   PLATELETS Thousands/uL 167 184   NEUTROS ABS Thousands/µL  --  10 00*     Results from last 7 days   Lab Units 02/14/20 0412 02/13/20  2333   SODIUM mmol/L 143 143   POTASSIUM mmol/L 4 1 4 2   CHLORIDE mmol/L 108 105   CO2 mmol/L 32 31   ANION GAP mmol/L 3* 7   BUN mg/dL 19 20   CREATININE mg/dL 1 72* 1 62*   EGFR ml/min/1 73sq m 47 50   CALCIUM mg/dL 8 6 8 7     Results from last 7 days   Lab Units 02/14/20  0412 02/13/20  2333   GLUCOSE RANDOM mg/dL 95 107     Results from last 7 days   Lab Units 02/13/20  2333   TSH 3RD GENERATON uIU/mL 6 109*   ED Treatment:   Medication Administration from 02/13/2020 2057 to 02/14/2020 1050       Date/Time Order Dose Route Action     02/13/2020 2115 fentanyl citrate (PF) (FOR EMS ONLY) 100 mcg/2 mL injection 100 mcg 0 mcg Does not apply Given to EMS     02/13/2020 2111 HYDROmorphone (DILAUDID) injection 1 mg 1 mg Intravenous Given     02/13/2020 2145 ketorolac (TORADOL) injection 30 mg 30 mg Intravenous Given     02/13/2020 2146 HYDROmorphone (DILAUDID) injection 1 mg 1 mg Intravenous Given     02/13/2020 2210 LORazepam (ATIVAN) injection 1 mg 1 mg Intravenous Given     02/13/2020 2223 HYDROmorphone (DILAUDID) injection 1 mg 1 mg Intravenous Given     02/14/2020 0754 acetaminophen (TYLENOL) tablet 975 mg 975 mg Oral Given     02/13/2020 2325 acetaminophen (TYLENOL) tablet 975 mg 975 mg Oral Given     02/14/2020 0601 oxyCODONE (ROXICODONE) IR tablet 5 mg 5 mg Oral Given     02/14/2020 0125 oxyCODONE (ROXICODONE) IR tablet 5 mg 5 mg Oral Given     02/14/2020 6350 oxyCODONE (ROXICODONE) immediate release tablet 10 mg 10 mg Oral Given     02/14/2020 0405 oxyCODONE (ROXICODONE) immediate release tablet 10 mg 10 mg Oral Given     02/14/2020 0920 HYDROmorphone (DILAUDID) injection 0 5 mg 0 5 mg Intravenous Given     02/14/2020 0558 HYDROmorphone (DILAUDID) injection 0 5 mg 0 5 mg Intravenous Given     02/14/2020 0347 HYDROmorphone (DILAUDID) injection 0 5 mg 0 5 mg Intravenous Given     02/14/2020 0102 HYDROmorphone (DILAUDID) injection 0 5 mg 0 5 mg Intravenous Given     02/14/2020 0601 levothyroxine tablet 50 mcg 50 mcg Oral Given     02/14/2020 0601 pantoprazole (PROTONIX) EC tablet 40 mg 40 mg Oral Given     02/14/2020 0412 lactated ringers infusion 125 mL/hr Intravenous New Bag        Past Medical History:   Diagnosis Date    Disease of thyroid gland     GERD (gastroesophageal reflux disease)      Present on Admission:   Closed fracture of left tibia and fibula   Hypothyroidism   Gastroesophageal reflux disease without esophagitis   Stage 3 chronic kidney disease (HCC)      Admitting Diagnosis: Leg injury [S89 90XA]  Age/Sex: 53 y o  male  Admission Orders:  Scheduled Medications:    Medications:  acetaminophen 975 mg Oral Q8H [address]   levothyroxine 50 mcg Oral Early Morning   pantoprazole 40 mg Oral Early Morning Continuous IV Infusions:    lactated ringers 125 mL/hr Intravenous Continuous     PRN Meds:    HYDROmorphone 0 5 mg Intravenous Q1H PRN x4   oxyCODONE 10 mg Oral Q4H PRN   oxyCODONE 5 mg Oral Q4H PRN     NPO     IP CONSULT TO ORTHOPEDIC SURGERY    Network Utilization Review Department  [email or web address]  ATTENTION: Please call with any questions or concerns to [phone] and carefully listen to the prompts so that you are directed to the right person  All voicemails are confidential   Miguel Peacock all requests for admission clinical reviews, approved or denied determinations and any other requests to dedicated fax number below belonging to the campus where the patient is receiving treatment   List of dedicated fax numbers for the Facilities:  [address] DENIALS (Administrative/Medical Necessity) [phone]   [address] (Maternity/NICU/Pediatrics) [phone]   Kami Alexis [phone]   Jayda [place] [phone]   [facility] [phone]   [facility] [phone]   [address] [place] [address] [phone]   [facility]  [phone]   [address] [facility] [address] [facility]  24[address] [facility] [address] [facility] [phone]

## 2020-02-14 NOTE — ED PROVIDER NOTES
History  Chief Complaint   Patient presents with    Leg Injury - Major     left leg ski injury,      49-year-old male with only past medical history of hypothyroidism is coming in with complaint of a fall while skiing  Patient was skiing when his left ski got caught in a rough patch and brought and he ended up flipping his left leg over his ski boot and felt an instant snap  Felt like his leg was unstable  He was helmeted so did not hit his head or lose consciousness  He said he was going at a relatively low rate and was not doing any jumps just caught his foot wrong and his shin went into his ski boot and went over  He complains of only pain localized to his left lower leg/shin  He denies any thigh or foot pain  He denies any chest or abdomen or back pain  History provided by:  Patient  Leg Pain   Location:  Leg  Time since incident:  1 hour  Injury: yes    Mechanism of injury: fall    Fall:     Fall occurred:  Skiing/snowboarding    Impact surface:  [employer] of impact: left leg  Entrapped after fall: no    Leg location:  L lower leg  Pain details:     Quality:  Pressure, shooting and throbbing    Radiates to:  Does not radiate    Severity:  Severe    Onset quality:  Sudden    Duration:  1 hour    Timing:  Constant    Progression:  Unchanged  Chronicity:  New  Dislocation: no    Prior injury to area:  No  Relieved by:  Immobilization  Exacerbated by: any movement  Ineffective treatments:  None tried  Associated symptoms: swelling    Associated symptoms: no decreased ROM        None       Past Medical History:   Diagnosis Date    Disease of thyroid gland     GERD (gastroesophageal reflux disease)        Past Surgical History:   Procedure Laterality Date    ROTATOR CUFF REPAIR Right        History reviewed  No pertinent family history  I have reviewed and agree with the history as documented      Social History     Tobacco Use    Smoking status: Never Smoker    Smokeless tobacco: Never Used Substance Use Topics    Alcohol use: Yes     Comment: social     Drug use: Never       Review of Systems   All other systems reviewed and are negative  Physical Exam  Physical Exam   Constitutional: He appears well-developed and well-nourished  HENT:   Head: Normocephalic and atraumatic  Eyes: Pupils are equal, round, and reactive to light  EOM are normal    Cardiovascular: Normal rate and regular rhythm  Pulmonary/Chest: Effort normal and breath sounds normal  No respiratory distress  He has no wheezes  Abdominal: Soft  Bowel sounds are normal  He exhibits no distension  There is no tenderness  Musculoskeletal:        Left upper leg: He exhibits no tenderness, no bony tenderness and no swelling  Left lower leg: He exhibits tenderness, bony tenderness, swelling and deformity  He exhibits no laceration  Left foot: There is no swelling and no deformity  Neurological: He is alert  No cranial nerve deficit or sensory deficit  He exhibits normal muscle tone  Skin: Skin is warm  No pallor  Vitals reviewed        Vital Signs  ED Triage Vitals [02/13/20 2102]   Temperature Pulse Resp Blood Pressure SpO2   98 4 °F (36 9 °C) 101 -- 125/86 96 %      Temp Source Heart Rate Source Patient Position - Orthostatic VS BP Location FiO2 (%)   Oral -- -- -- --      Pain Score       8           Vitals:    02/13/20 2102   BP: 125/86   Pulse: 101         Visual Acuity      ED Medications  Medications   fentanyl citrate (PF) (FOR EMS ONLY) 100 mcg/2 mL injection 100 mcg (has no administration in time range)   HYDROmorphone (DILAUDID) injection 1 mg (1 mg Intravenous Given 2/13/20 2111)   ketorolac (TORADOL) injection 30 mg (30 mg Intravenous Given 2/13/20 2145)   HYDROmorphone (DILAUDID) injection 1 mg (1 mg Intravenous Given 2/13/20 2146)   LORazepam (ATIVAN) injection 1 mg (1 mg Intravenous Given 2/13/20 2210)   HYDROmorphone (DILAUDID) injection 1 mg (1 mg Intravenous Given 2/13/20 2223) Diagnostic Studies  Results Reviewed     Procedure Component Value Units Date/Time    CBC and differential [[id number]]     Lab Status:  No result Specimen:  Blood     Basic metabolic panel [[id number]]     Lab Status:  No result Specimen:  Blood                  XR tibia fibula 2 views LEFT   ED Interpretation by Kevin Florian MD (02/13 2137)   Abnormal   Comminuted tib-fib fracture      CT lower extremity wo contrast left    (Results Pending)              Procedures  Splint application  Date/Time: 2/13/2020 10:37 PM  Performed by: Kevin Florian MD  Authorized by: Kevin Florian MD     Patient location:  Bedside  Performing Provider:  Attending  Consent:     Consent obtained:  Verbal    Consent given by:  Patient  Universal protocol:     Patient identity confirmed:  Verbally with patient  Indication:     Indications: fracture    Pre-procedure details:     Sensation:  Normal  Procedure details:     Laterality:  Left    Location:  Leg    Leg:  L lower leg    Splint type:  Sugar tong and long leg    Supplies:  Ortho-Glass, cotton padding and elastic bandage  Post-procedure details:     Pain:  Improved    Sensation:  Normal    Neurovascular Exam: skin pink      Patient tolerance of procedure: Tolerated with difficulty             ED Course                               MDM  Number of Diagnoses or Management Options  Closed fracture of shaft of tibia and fibula, left, initial encounter: new and requires workup  Intractable pain: new and requires workup     Amount and/or Complexity of Data Reviewed  Clinical lab tests: ordered  Tests in the radiology section of CPT®: ordered  Discuss the patient with other providers: yes (Spoke with Dr Beryle Frieze, can be admitted here and will try and do surgery tomorrow  Pt is agreeable   Admit to medicine and requesting a CT for surgical planning )  Independent visualization of images, tracings, or specimens: yes          Disposition  Final diagnoses:   Closed fracture of shaft of tibia and fibula, left, initial encounter   Intractable pain     Time reflects when diagnosis was documented in both MDM as applicable and the Disposition within this note     Time User Action Codes Description Comment    2/13/2020 10:30 PM HUE Gold/Eduar Lopez,  S82 402A] Closed fracture of shaft of tibia and fibula, left, initial encounter     2/13/2020 10:30 PM Dharmesh Serrano, [address] [[id number]] Intractable pain       ED Disposition     ED Disposition Condition Date/Time Comment    Admit Stable Thu Feb 13, 2020 10:30 PM Case was discussed with Nahomy and the patient's admission status was agreed to be Admission Status: inpatient status to the service of Dr Megan Mata   Follow-up Information    None         Patient's Medications    No medications on file     No discharge procedures on file      PDMP Review     None          ED Provider  Electronically Signed by           Li Daily MD  02/13/20 7790

## 2020-02-14 NOTE — ASSESSMENT & PLAN NOTE
· Creatinine may be falsely elevated due to high amount of muscle mass  · Avoid nephrotoxins and hypotension  · Ensure adequate oral intake and hydration  · Monitor renal function, electrolytes

## 2020-02-14 NOTE — H&P
H&P- Jenifer Man 1975, 53 y o  male MRN: [id number]    Unit/Bed#: ED 16 Encounter: [id number]    Primary Care Provider: No primary care provider on file  Date and time admitted to hospital: 2/13/2020  8:57 PM        Stage 3 chronic kidney disease ([facility] [address] )  Assessment & Plan  Creatinine may be falsely elevated due to high amount of muscle mass  Avoid nephrotoxins and hypotension  Ensure adequate oral intake and hydration  Monitor renal function, electrolytes    Gastroesophageal reflux disease without esophagitis  Assessment & Plan  Protonix daily    Hypothyroidism  Assessment & Plan  Levothyroxine daily    * Closed fracture of left tibia and fibula  Assessment & Plan  Patient reports good for constitutional status at baseline  He does active sports including skiing  His RCRI score is 0, indicating class 1 risk, low risk of cardiac event  · Patient can go to the OR at the discretion of the orthopedic team  · Consult orthopedic surgery  · Continue pain regimen  · Scheduled Tylenol  · P r n  Oxycodone and Dilaudid  · Apply ice to affected area  · NPO  · Continue IV hydration  · Hold aspirin and heparin      VTE Prophylaxis: Pharmacologic VTE Prophylaxis contraindicated due to Upcoming procedure  / sequential compression device   Code Status:  level 1-full code  POLST: POLST form is not discussed and not completed at this time  Anticipated Length of Stay:  Patient will be admitted on an Inpatient basis with an anticipated length of stay of > 2 midnights  Justification for Hospital Stay: Please see detailed plans noted above  Chief Complaint:     Leg fracture    History of Present Illness:  Jenifer Man is a 53 y o  male who presents with an acute traumatic fracture of his left lower extremity  Patient himself has past medical history of hypothyroidism, GERD  He presented to the ED today after an injury he sustained to his left foot while skiing    He reports that while he was skiing, his left ski got caught, he than ended up flipping over his ski boot and snapping his leg  He states that he was not going very fast of the time, was wearing a helmet, did not hit his head or lose consciousness  In the ED, x-ray showed acute fracture of his left tibia and fibula  Patient was splinted and admitted to the hospital for orthopedic intervention  Currently, patient is awake, alert, no acute distress  He states the pain in his left leg is improved if he does not move it  Family is present at bedside  Patient does report that he takes aspirin daily for maintenance    He also reports that he tends to have an elevated creatinine which he thinks is due to his muscle mass, and follows with PCP regularly  Review of Systems   Constitutional: Negative for activity change, chills and fever  HENT: Negative  Negative for hearing loss, sore throat and trouble swallowing  Eyes: Negative for visual disturbance  Respiratory: Negative for cough, shortness of breath and wheezing  Cardiovascular: Negative for chest pain, palpitations and leg swelling  Gastrointestinal: Negative for abdominal distention, abdominal pain, blood in stool, constipation, diarrhea, nausea and vomiting  Endocrine: Negative  Genitourinary: Negative for dysuria, frequency and hematuria  Musculoskeletal: Negative for arthralgias and joint swelling  Left shin pain   Skin: Negative  Allergic/Immunologic: Negative for immunocompromised state  Neurological: Negative for dizziness, facial asymmetry, speech difficulty, weakness, numbness and headaches  Hematological: Negative  Psychiatric/Behavioral: Negative for behavioral problems and confusion           Past Medical and Surgical History:   Past Medical History:   Diagnosis Date    Disease of thyroid gland     GERD (gastroesophageal reflux disease)      Past Surgical History:   Procedure Laterality Date    ROTATOR CUFF REPAIR Right        Meds/Allergies:    (Not in a hospital admission)    Allergies: No Known Allergies  History:  Marital Status: /Civil Union   Occupation: Deputy lane  Patient Pre-hospital Living Situation: Lives with family  Patient Pre-hospital Level of Mobility: ambulatory  Patient Pre-hospital Diet Restrictions: none  Substance Use History:   Social History     Substance and Sexual Activity   Alcohol Use Yes    Comment: social      Social History     Tobacco Use   Smoking Status Never Smoker   Smokeless Tobacco Never Used     Social History     Substance and Sexual Activity   Drug Use Never       Family History:  History reviewed  No pertinent family history  Physical Exam:     Vitals:   Blood Pressure: 136/74 (02/13/20 2328)  Pulse: 93 (02/13/20 2328)  Temperature: 98 4 °F (36 9 °C) (02/13/20 2102)  Temp Source: Oral (02/13/20 2102)  Respirations: 18 (02/13/20 2328)  Weight - Scale: 102 kg (225 lb) (02/13/20 2102)  SpO2: 96 % (02/13/20 2328)    Constitutional:  Well developed, well nourished, no acute distress, non-toxic appearance  Patient very muscular  Eyes:  PERRL, conjunctiva normal   HENT:  Atraumatic, external ears normal, nose normal, oropharynx moist, no pharyngeal exudates  Neck - normal range of motion, no tenderness, supple   Respiratory:  No respiratory distress  Normal breath sounds  No rales, no wheezing   Cardiovascular:  Normal rate, normal rhythm, no murmurs, no gallops, no rubs   GI:  Soft, nondistended, normal bowel sounds, nontender, no organomegaly, no mass, no rebound, no guarding   :  No costovertebral angle tenderness  Musculoskeletal:  No edema, left leg tenderness, no deformities  Back - no tenderness    Left leg status post splint placement  Integument:  Well hydrated, no rash   Lymphatic:  No lymphadenopathy noted   Neurologic:  Alert & awake, communicative, CN 2-12 normal, normal motor function, normal sensory function, no focal deficits noted   Psychiatric:  Speech and behavior appropriate       Lab Results: I have personally reviewed pertinent reports  Results from last 7 days   Lab Units 02/13/20  2333   WBC Thousand/uL 12 44*   HEMOGLOBIN g/dL 15 0   HEMATOCRIT % 46 2   PLATELETS Thousands/uL 184   NEUTROS PCT % 81*   LYMPHS PCT % 12*   MONOS PCT % 6   EOS PCT % 1     Results from last 7 days   Lab Units 02/13/20  2333   POTASSIUM mmol/L 4 2   CHLORIDE mmol/L 105   CO2 mmol/L 31   BUN mg/dL 20   CREATININE mg/dL 1 62*   CALCIUM mg/dL 8 7           EKG:  Not obtained this admission    Imaging: I have personally reviewed pertinent reports  and I have personally reviewed pertinent films in PACS    No results found  Portions of the record may have been created with voice recognition software  Occasional wrong word or "sound a like" substitutions may have occurred due to the inherent limitations of voice recognition software  Read the chart carefully and recognize, using context, where substitutions have occurred

## 2020-02-14 NOTE — PROGRESS NOTES
Progress Note - Trent Asa 1975, 53 y o  male MRN: [id number]    Unit/Bed#: [facility]UBIA L[facility] Encounter: [id number]    Primary Care Provider: No primary care provider on file  Date and time admitted to hospital: 2020  8:57 PM    * Closed fracture of left tibia and fibula  Assessment & Plan  · Fracture suffered while skiing, patient is incredibly active and underwent tib-fib surgery today with orthopedics  · Continue pain regimen  · Scheduled Tylenol  · P r n  Oxycodone and Dilaudid  · Apply ice to affected area  · Continue IV hydration  · Hold aspirin and heparin, resume postop  · Weightbearing status per ortho    Gastroesophageal reflux disease without esophagitis  Assessment & Plan  · Protonix daily    Hypothyroidism  Assessment & Plan  · Levothyroxine daily    Stage 3 chronic kidney disease (HCC)  Assessment & Plan  · Creatinine may be falsely elevated due to high amount of muscle mass  · Avoid nephrotoxins and hypotension  · Ensure adequate oral intake and hydration  · Monitor renal function, electrolytes        VTE Pharmacologic Prophylaxis:   Pharmacologic: Heparin  Mechanical VTE Prophylaxis in Place: Yes    Discussions with Specialists or Other Care Team Provider: ortho, CM     Time Spent for Care: 15 minutes reviewing chart and discussing with medical team ONLY  Patient in OR/preop and unable to be evaluated on multiple attempts  Current Length of Stay: 1 day(s)    Current Patient Status: Inpatient   Certification Statement: The patient will continue to require additional inpatient hospital stay due to post op    Discharge Plan: pending post op course    Code Status: Level 1 - Full Code      Subjective:   Patient chart reviewed  Unable to evaluate patient due to OR      Objective:     Vitals:   Temp (24hrs), Av 4 °F (36 9 °C), Min:98 3 °F (36 8 °C), Max:98 4 °F (36 9 °C)    Temp:  [98 3 °F (36 8 °C)-98 4 °F (36 9 °C)] 98 3 °F (36 8 °C)  HR:  [] 107  Resp:  [16-20] 20  BP: (114-144)/(69-86) 143/86  SpO2:  [95 %-99 %] 99 %  There is no height or weight on file to calculate BMI  Input and Output Summary (last 24 hours): Intake/Output Summary (Last 24 hours) at 2/14/2020 1653  Last data filed at 2/14/2020 1647  Gross per 24 hour   Intake 1000 ml   Output 550 ml   Net 450 ml       Physical Exam:     Physical Exam deferred    Additional Data:     Labs:    Results from last 7 days   Lab Units 02/14/20  0412 02/13/20  2333   WBC Thousand/uL 9 82 12 44*   HEMOGLOBIN g/dL 14 0 15 0   HEMATOCRIT % 43 7 46 2   PLATELETS Thousands/uL 167 184   NEUTROS PCT %  --  81*   LYMPHS PCT %  --  12*   MONOS PCT %  --  6   EOS PCT %  --  1     Results from last 7 days   Lab Units 02/14/20  0412   SODIUM mmol/L 143   POTASSIUM mmol/L 4 1   CHLORIDE mmol/L 108   CO2 mmol/L 32   BUN mg/dL 19   CREATININE mg/dL 1 72*   ANION GAP mmol/L 3*   CALCIUM mg/dL 8 6   GLUCOSE RANDOM mg/dL 95                           * I Have Reviewed All Lab Data Listed Above  * Additional Pertinent Lab Tests Reviewed:  [address] Admission Reviewed    Imaging:    Imaging Reports Reviewed Today Include: CT, XR lle  Imaging Personally Reviewed by Myself Includes:      Recent Cultures (last 7 days):           Last 24 Hours Medication List:     Current Facility-Administered Medications:  acetaminophen 975 mg Oral Q8H [address] Debe Rickers, DO   HYDROmorphone 0 5 mg Intravenous Q1H PRN Ruel Escobedo, DO   levothyroxine 50 mcg Oral Early Morning [facility], DO   oxyCODONE 10 mg Oral Q4H PRN Ashleee Fanny, DO   oxyCODONE 5 mg Oral Q4H PRN Ashleee Fanny, DO   pantoprazole 40 mg Oral Early Morning Ashleee Fanny, DO   sodium chloride   PRN Brett Lord MD   sterile water   PRN Brett Lord MD     Facility-Administered Medications Ordered in Other Encounters:  ceFAZolin   PRN Aria Gauss, CRNA   dexamethasone (PF)   PRN Aria Gauss, CRNA   dexmedetomidine   PRN Aria Gauss, CRNA fentanyl citrate (PF)  Intravenous PRN Sarath Caicedo, CRNA   ketamine   PRN Sarath Caicedo, CRNA   lidocaine (PF)   PRN Sarath Caicedo, CRNA   midazolam  Intravenous PRN Sarath Caicedo, CRNA   phenylephrine   PRN Sarath Caicedo, CRNA   propofol  Intravenous PRN Sarath Caicedo, CRNA   ROCuronium   PRN Sarath Caicedo, CRNA        Today, Patient Was Seen By: Lyndsey Mena PA-C    ** Please Note: Dictation voice to text software may have been used in the creation of this document   **

## 2020-02-14 NOTE — ASSESSMENT & PLAN NOTE
Patient reports good for constitutional status at baseline  He does active sports including skiing  His RCRI score is 0, indicating class 1 risk, low risk of cardiac event  · Patient can go to the OR at the discretion of the orthopedic team  · Consult orthopedic surgery  · Continue pain regimen  · Scheduled Tylenol  · P r n   Oxycodone and Dilaudid  · Apply ice to affected area  · NPO  · Continue IV hydration  · Hold aspirin and heparin

## 2020-02-15 LAB
ANION GAP SERPL CALCULATED.3IONS-SCNC: 5 MMOL/L (ref 4–13)
BUN SERPL-MCNC: 14 MG/DL (ref 5–25)
CALCIUM SERPL-MCNC: 8.1 MG/DL (ref 8.3–10.1)
CHLORIDE SERPL-SCNC: 101 MMOL/L (ref 100–108)
CO2 SERPL-SCNC: 32 MMOL/L (ref 21–32)
CREAT SERPL-MCNC: 1.52 MG/DL (ref 0.6–1.3)
ERYTHROCYTE [DISTWIDTH] IN BLOOD BY AUTOMATED COUNT: 14.7 % (ref 11.6–15.1)
GFR SERPL CREATININE-BSD FRML MDRD: 54 ML/MIN/1.73SQ M
GLUCOSE SERPL-MCNC: 127 MG/DL (ref 65–140)
HCT VFR BLD AUTO: 38.6 % (ref 36.5–49.3)
HGB BLD-MCNC: 12.4 G/DL (ref 12–17)
MCH RBC QN AUTO: 28.6 PG (ref 26.8–34.3)
MCHC RBC AUTO-ENTMCNC: 32.1 G/DL (ref 31.4–37.4)
MCV RBC AUTO: 89 FL (ref 82–98)
PLATELET # BLD AUTO: 159 THOUSANDS/UL (ref 149–390)
PMV BLD AUTO: 11.7 FL (ref 8.9–12.7)
POTASSIUM SERPL-SCNC: 4 MMOL/L (ref 3.5–5.3)
RBC # BLD AUTO: 4.34 MILLION/UL (ref 3.88–5.62)
SODIUM SERPL-SCNC: 138 MMOL/L (ref 136–145)
WBC # BLD AUTO: 13.57 THOUSAND/UL (ref 4.31–10.16)

## 2020-02-15 PROCEDURE — 99024 POSTOP FOLLOW-UP VISIT: CPT | Performed by: PHYSICIAN ASSISTANT

## 2020-02-15 PROCEDURE — 97163 PT EVAL HIGH COMPLEX 45 MIN: CPT

## 2020-02-15 PROCEDURE — 85027 COMPLETE CBC AUTOMATED: CPT | Performed by: ORTHOPAEDIC SURGERY

## 2020-02-15 PROCEDURE — 80048 BASIC METABOLIC PNL TOTAL CA: CPT | Performed by: ORTHOPAEDIC SURGERY

## 2020-02-15 PROCEDURE — 99233 SBSQ HOSP IP/OBS HIGH 50: CPT | Performed by: PHYSICIAN ASSISTANT

## 2020-02-15 RX ORDER — HYDROMORPHONE HCL/PF 1 MG/ML
1 SYRINGE (ML) INJECTION
Status: DISCONTINUED | OUTPATIENT
Start: 2020-02-15 | End: 2020-02-17 | Stop reason: HOSPADM

## 2020-02-15 RX ORDER — HYDROMORPHONE HYDROCHLORIDE 2 MG/1
2 TABLET ORAL EVERY 4 HOURS PRN
Qty: 24 TABLET | Refills: 0 | Status: SHIPPED | OUTPATIENT
Start: 2020-02-15

## 2020-02-15 RX ORDER — GABAPENTIN 100 MG/1
200 CAPSULE ORAL EVERY 8 HOURS
Status: DISCONTINUED | OUTPATIENT
Start: 2020-02-15 | End: 2020-02-17 | Stop reason: HOSPADM

## 2020-02-15 RX ORDER — HYDROMORPHONE HCL/PF 1 MG/ML
1 SYRINGE (ML) INJECTION ONCE
Status: COMPLETED | OUTPATIENT
Start: 2020-02-15 | End: 2020-02-15

## 2020-02-15 RX ORDER — OXYCODONE HYDROCHLORIDE 10 MG/1
10 TABLET ORAL EVERY 4 HOURS PRN
Status: DISCONTINUED | OUTPATIENT
Start: 2020-02-15 | End: 2020-02-15

## 2020-02-15 RX ORDER — ASPIRIN 325 MG
325 TABLET ORAL EVERY 12 HOURS SCHEDULED
Refills: 0
Start: 2020-02-15

## 2020-02-15 RX ORDER — HYDROMORPHONE HYDROCHLORIDE 2 MG/1
1 TABLET ORAL EVERY 4 HOURS PRN
Status: DISCONTINUED | OUTPATIENT
Start: 2020-02-15 | End: 2020-02-16

## 2020-02-15 RX ORDER — ACETAMINOPHEN 325 MG/1
975 TABLET ORAL EVERY 8 HOURS SCHEDULED
Qty: 30 TABLET | Refills: 0
Start: 2020-02-15

## 2020-02-15 RX ORDER — HYDROMORPHONE HCL/PF 1 MG/ML
1 SYRINGE (ML) INJECTION
Status: DISCONTINUED | OUTPATIENT
Start: 2020-02-15 | End: 2020-02-15

## 2020-02-15 RX ORDER — ASPIRIN 325 MG
325 TABLET ORAL EVERY 12 HOURS SCHEDULED
Status: DISCONTINUED | OUTPATIENT
Start: 2020-02-15 | End: 2020-02-17 | Stop reason: HOSPADM

## 2020-02-15 RX ORDER — HYDROMORPHONE HYDROCHLORIDE 2 MG/1
2 TABLET ORAL EVERY 4 HOURS PRN
Status: DISCONTINUED | OUTPATIENT
Start: 2020-02-15 | End: 2020-02-16

## 2020-02-15 RX ORDER — GABAPENTIN 100 MG/1
200 CAPSULE ORAL EVERY 8 HOURS
Qty: 84 CAPSULE | Refills: 0 | Status: SHIPPED | OUTPATIENT
Start: 2020-02-15 | End: 2020-02-29

## 2020-02-15 RX ORDER — POLYETHYLENE GLYCOL 3350 17 G/17G
17 POWDER, FOR SOLUTION ORAL DAILY PRN
Status: DISCONTINUED | OUTPATIENT
Start: 2020-02-15 | End: 2020-02-17 | Stop reason: HOSPADM

## 2020-02-15 RX ORDER — ACETAMINOPHEN 325 MG/1
975 TABLET ORAL ONCE
Status: COMPLETED | OUTPATIENT
Start: 2020-02-15 | End: 2020-02-15

## 2020-02-15 RX ORDER — DOCUSATE SODIUM 100 MG/1
100 CAPSULE, LIQUID FILLED ORAL 2 TIMES DAILY
Status: DISCONTINUED | OUTPATIENT
Start: 2020-02-15 | End: 2020-02-17 | Stop reason: HOSPADM

## 2020-02-15 RX ADMIN — HYDROMORPHONE HYDROCHLORIDE 2 MG: 2 TABLET ORAL at 14:37

## 2020-02-15 RX ADMIN — ACETAMINOPHEN 975 MG: 325 TABLET, FILM COATED ORAL at 08:58

## 2020-02-15 RX ADMIN — HYDROMORPHONE HYDROCHLORIDE 0.5 MG: 1 INJECTION, SOLUTION INTRAMUSCULAR; INTRAVENOUS; SUBCUTANEOUS at 04:17

## 2020-02-15 RX ADMIN — HYDROMORPHONE HYDROCHLORIDE 1 MG: 1 INJECTION, SOLUTION INTRAMUSCULAR; INTRAVENOUS; SUBCUTANEOUS at 10:50

## 2020-02-15 RX ADMIN — HYDROMORPHONE HYDROCHLORIDE 1 MG: 1 INJECTION, SOLUTION INTRAMUSCULAR; INTRAVENOUS; SUBCUTANEOUS at 10:49

## 2020-02-15 RX ADMIN — HYDROMORPHONE HYDROCHLORIDE 1 MG: 1 INJECTION, SOLUTION INTRAMUSCULAR; INTRAVENOUS; SUBCUTANEOUS at 08:27

## 2020-02-15 RX ADMIN — GABAPENTIN 100 MG: 100 CAPSULE ORAL at 06:08

## 2020-02-15 RX ADMIN — GABAPENTIN 100 MG: 100 CAPSULE ORAL at 14:21

## 2020-02-15 RX ADMIN — DOCUSATE SODIUM 100 MG: 100 CAPSULE, LIQUID FILLED ORAL at 18:16

## 2020-02-15 RX ADMIN — HYDROMORPHONE HYDROCHLORIDE 0.5 MG: 1 INJECTION, SOLUTION INTRAMUSCULAR; INTRAVENOUS; SUBCUTANEOUS at 00:44

## 2020-02-15 RX ADMIN — ASPIRIN 325 MG ORAL TABLET 325 MG: 325 PILL ORAL at 21:30

## 2020-02-15 RX ADMIN — PANTOPRAZOLE SODIUM 40 MG: 40 TABLET, DELAYED RELEASE ORAL at 06:08

## 2020-02-15 RX ADMIN — HYDROMORPHONE HYDROCHLORIDE 1 MG: 1 INJECTION, SOLUTION INTRAMUSCULAR; INTRAVENOUS; SUBCUTANEOUS at 16:43

## 2020-02-15 RX ADMIN — ACETAMINOPHEN 975 MG: 325 TABLET, FILM COATED ORAL at 14:21

## 2020-02-15 RX ADMIN — HYDROMORPHONE HYDROCHLORIDE 0.5 MG: 1 INJECTION, SOLUTION INTRAMUSCULAR; INTRAVENOUS; SUBCUTANEOUS at 06:09

## 2020-02-15 RX ADMIN — LEVOTHYROXINE SODIUM 50 MCG: 50 TABLET ORAL at 06:07

## 2020-02-15 RX ADMIN — HYDROMORPHONE HYDROCHLORIDE 2 MG: 2 TABLET ORAL at 20:29

## 2020-02-15 RX ADMIN — DEXTRAN 70 AND HYPROMELLOSE 2910 1 DROP: 1; 3 SOLUTION/ DROPS OPHTHALMIC at 13:07

## 2020-02-15 RX ADMIN — OXYCODONE HYDROCHLORIDE 10 MG: 10 TABLET ORAL at 02:58

## 2020-02-15 RX ADMIN — ACETAMINOPHEN 975 MG: 325 TABLET, FILM COATED ORAL at 22:30

## 2020-02-15 RX ADMIN — CEFAZOLIN SODIUM 2000 MG: 2 SOLUTION INTRAVENOUS at 06:08

## 2020-02-15 RX ADMIN — ASPIRIN 325 MG ORAL TABLET 325 MG: 325 PILL ORAL at 08:16

## 2020-02-15 RX ADMIN — HYDROMORPHONE HYDROCHLORIDE 1 MG: 1 INJECTION, SOLUTION INTRAMUSCULAR; INTRAVENOUS; SUBCUTANEOUS at 21:30

## 2020-02-15 RX ADMIN — GABAPENTIN 200 MG: 100 CAPSULE ORAL at 21:30

## 2020-02-15 NOTE — PLAN OF CARE
Problem: Prexisting or High Potential for Compromised Skin Integrity  Goal: Skin integrity is maintained or improved  Description  INTERVENTIONS:  - Identify patients at risk for skin breakdown  - Assess and monitor skin integrity  - Assess and monitor nutrition and hydration status  - Monitor labs   - Assess for incontinence   - Turn and reposition patient  - Assist with mobility/ambulation  - Relieve pressure over bony prominences  - Avoid friction and shearing  - Provide appropriate hygiene as needed including keeping skin clean and dry  - Evaluate need for skin moisturizer/barrier cream  - Collaborate with interdisciplinary team   - Patient/family teaching  - Consider wound care consult   Outcome: Progressing     Problem: Potential for Falls  Goal: Patient will remain free of falls  Description  INTERVENTIONS:  - Assess patient frequently for physical needs  -  Identify cognitive and physical deficits and behaviors that affect risk of falls    -  [place] fall precautions as indicated by assessment   - Educate patient/family on patient safety including physical limitations  - Instruct patient to call for assistance with activity based on assessment  - Modify environment to reduce risk of injury  - Consider OT/PT consult to assist with strengthening/mobility  Outcome: Progressing     Problem: PAIN - ADULT  Goal: Verbalizes/displays adequate comfort level or baseline comfort level  Description  Interventions:  - Encourage patient to monitor pain and request assistance  - Assess pain using appropriate pain scale  - Administer analgesics based on type and severity of pain and evaluate response  - Implement non-pharmacological measures as appropriate and evaluate response  - Consider cultural and social influences on pain and pain management  - Notify physician/advanced practitioner if interventions unsuccessful or patient reports new pain  Outcome: Progressing     Problem: INFECTION - ADULT  Goal: Absence or prevention of progression during hospitalization  Description  INTERVENTIONS:  - Assess and monitor for signs and symptoms of infection  - Monitor lab/diagnostic results  - Monitor all insertion sites, i e  indwelling lines, tubes, and drains  - Monitor endotracheal if appropriate and nasal secretions for changes in amount and color  - [place] appropriate cooling/warming therapies per order  - Administer medications as ordered  - Instruct and encourage patient and family to use good hand hygiene technique  - Identify and instruct in appropriate isolation precautions for identified infection/condition  Outcome: Progressing     Problem: SAFETY ADULT  Goal: Maintain or return to baseline ADL function  Description  INTERVENTIONS:  -  Assess patient's ability to carry out ADLs; assess patient's baseline for ADL function and identify physical deficits which impact ability to perform ADLs (bathing, care of mouth/teeth, toileting, grooming, dressing, etc )  - Assess/evaluate cause of self-care deficits   - Assess range of motion  - Assess patient's mobility; develop plan if impaired  - Assess patient's need for assistive devices and provide as appropriate  - Encourage maximum independence but intervene and supervise when necessary  - Involve family in performance of ADLs  - Assess for home care needs following discharge   - Consider OT consult to assist with ADL evaluation and planning for discharge  - Provide patient education as appropriate  Outcome: Progressing  Goal: Maintain or return mobility status to optimal level  Description  INTERVENTIONS:  - Assess patient's baseline mobility status (ambulation, transfers, stairs, etc )    - Identify cognitive and physical deficits and behaviors that affect mobility  - Identify mobility aids required to assist with transfers and/or ambulation (gait belt, sit-to-stand, lift, walker, cane, etc )  - [place] fall precautions as indicated by assessment  - Record patient progress and toleration of activity level on Mobility SBAR; progress patient to next Phase/Stage  - Instruct patient to call for assistance with activity based on assessment  - Consider rehabilitation consult to assist with strengthening/weightbearing, etc   Outcome: Progressing     Problem: DISCHARGE PLANNING  Goal: Discharge to home or other facility with appropriate resources  Description  INTERVENTIONS:  - Identify barriers to discharge w/patient and caregiver  - Arrange for needed discharge resources and transportation as appropriate  - Identify discharge learning needs (meds, wound care, etc )  - Arrange for interpretive services to assist at discharge as needed  - Refer to Case Management Department for coordinating discharge planning if the patient needs post-hospital services based on physician/advanced practitioner order or complex needs related to functional status, cognitive ability, or social support system  Outcome: Progressing     Problem: Knowledge Deficit  Goal: Patient/family/caregiver demonstrates understanding of disease process, treatment plan, medications, and discharge instructions  Description  Complete learning assessment and assess knowledge base    Interventions:  - Provide teaching at level of understanding  - Provide teaching via preferred learning methods  Outcome: Progressing     Problem: GENITOURINARY - ADULT  Goal: Maintains or returns to baseline urinary function  Description  INTERVENTIONS:  - Assess urinary function  - Encourage oral fluids to ensure adequate hydration if ordered  - Administer IV fluids as ordered to ensure adequate hydration  - Administer ordered medications as needed  - Offer frequent toileting  - Follow urinary retention protocol if ordered  Outcome: Progressing  Goal: Urinary catheter remains patent  Description  INTERVENTIONS:  - Assess patency of urinary catheter  - If patient has a chronic ortez, consider changing catheter if non-functioning  - Follow guidelines for intermittent irrigation of non-functioning urinary catheter  Outcome: Progressing     Problem: MUSCULOSKELETAL - ADULT  Goal: Maintain or return mobility to safest level of function  Description  INTERVENTIONS:  - Assess patient's ability to carry out ADLs; assess patient's baseline for ADL function and identify physical deficits which impact ability to perform ADLs (bathing, care of mouth/teeth, toileting, grooming, dressing, etc )  - Assess/evaluate cause of self-care deficits   - Assess range of motion  - Assess patient's mobility  - Assess patient's need for assistive devices and provide as appropriate  - Encourage maximum independence but intervene and supervise when necessary  - Involve family in performance of ADLs  - Assess for home care needs following discharge   - Consider OT consult to assist with ADL evaluation and planning for discharge  - Provide patient education as appropriate  Outcome: Progressing  Goal: Maintain proper alignment of affected body part  Description  INTERVENTIONS:  - Support, maintain and protect limb and body alignment  - Provide patient/ family with appropriate education  Outcome: Progressing

## 2020-02-15 NOTE — SOCIAL WORK
Pt was hoping to be discharged today and wanted to return to [place] tomorrow  Pt will need a w/c and rx faxed to [facility] for delivery today  SLIM discussed the dangers of this plan and is recommending acute rehab  CM will place referrals, but pt unsure of dcp  Wife will be to the hospital shortly and will discuss  [facility] pended per his permission  Understands that he has freedom of choice

## 2020-02-15 NOTE — OCCUPATIONAL THERAPY NOTE
Occupational Therapy Screen Note        Patient Name: Kasi Riojas  UCGYA'E Date: 2/15/2020      OT order received,  chart review indicates this is a  53 y o male post operative day 1 left tibial intramedullary nail and ORIF left mid to distal fibula, s/p injury to left foot while skiing, x-ray showed acute fracture of his left tibia and fibula  Patient is NWB to LLE, Nursing staff reports independent with self care in bed, PT will be working with patient on mobility training with crutches  Patient is independent at baseline, No acute OT needs identified at this time to warrant OT evaluation  Per discussion with Lei Guerrero from Ortho, patient may d/c today to return to [place] tomorrow, and will follow up with Orthopedics as an OP   D/C OT services

## 2020-02-15 NOTE — UTILIZATION REVIEW
Continued Stay Review    Date: 2/15                      Current Patient Class: IP Current Level of Care: MS    HPI:44 y o  male initially admitted on 2/13    Assessment/Plan: admitted w/ closed fx L tib /fib after skiing accident   POD#1Tibial IM nailing, ORIF fibula fracture  El;evate leg , pain control   Pt needs to fly back to [place] and pain is too severe   Also has postoperative bleeding that should be monitored  2/15 Ortho note   post operative day 1 left tibial intramedullary nail and ORIF left mid to distal fibula  Patient had some drainage overnight posterior aspect of the dressing was saturated  States he has numbness on the top of his foot and on the lateral aspect of the ankle  At times he gets a burning sensation down the leg  NWB LLE , ice , elevate , pain control , PT OT , WC to get back to [facility]   Assess for acute blood loss anemia        Pertinent Labs/Diagnostic Results:   Results from last 7 days   Lab Units 02/15/20  0543 02/14/20 0412 02/13/20  2333   WBC Thousand/uL 13 57* 9 82 12 44*   HEMOGLOBIN g/dL 12 4 14 0 15 0   HEMATOCRIT % 38 6 43 7 46 2   PLATELETS Thousands/uL 159 167 184   NEUTROS ABS Thousands/µL  --   --  10 00*     Results from last 7 days   Lab Units 02/15/20  0543 02/14/20  0412 02/13/20  2333   SODIUM mmol/L 138 143 143   POTASSIUM mmol/L 4 0 4 1 4 2   CHLORIDE mmol/L 101 108 105   CO2 mmol/L 32 32 31   ANION GAP mmol/L 5 3* 7   BUN mg/dL 14 19 20   CREATININE mg/dL 1 52* 1 72* 1 62*   EGFR ml/min/1 73sq m 54 47 50   CALCIUM mg/dL 8 1* 8 6 8 7     Results from last 7 days   Lab Units 02/15/20  0543 02/14/20  0412 02/13/20  2333   GLUCOSE RANDOM mg/dL 127 95 107     Results from last 7 days   Lab Units 02/13/20  2333   TSH 3RD GENERATON uIU/mL 6 109*         Vital Signs:   02/15/20 07:04:43  98 6 °F (37 °C)  93  18  135/75  95  99 %         Medications:   Scheduled Medications:    Medications:  acetaminophen 975 mg Oral Q8H ANA   aspirin 325 mg Oral Daily   gabapentin 100 mg Oral Q8H   levothyroxine 50 mcg Oral Early Morning   pantoprazole 40 mg Oral Early Morning     Continuous IV Infusions:     PRN Meds:    aluminum-magnesium hydroxide-simethicone 30 mL Oral Q6H PRN   dextran 70-hypromellose 1 drop Both Eyes Q3H PRN   HYDROmorphone 1 mg Intravenous Q3H PRN x1   ondansetron 4 mg Intravenous Q6H PRN   oxyCODONE 10 mg Oral Q4H PRN       Discharge Plan: D     Network Utilization Review Department  [email or web address]  ATTENTION: Please call with any questions or concerns to [phone] and carefully listen to the prompts so that you are directed to the right person  All voicemails are confidential   Lon Bermudez all requests for admission clinical reviews, approved or denied determinations and any other requests to dedicated fax number below belonging to the campus where the patient is receiving treatment   List of dedicated fax numbers for the Facilities:  [address] DENIALS (Administrative/Medical Necessity) [phone]   [address] (Maternity/NICU/Pediatrics) [phone]   Christine Medina [phone]   [facility] [phone]   Cortes Crews [phone]   Margi Jones [phone]   [address] [place] [address] [phone]   [facility]  [phone]   [address] [facility] [address]  [address] [facility] [address] [facility] [phone]

## 2020-02-15 NOTE — OCCUPATIONAL THERAPY NOTE
Occupational Therapy Cancellation Note        Patient Name: Sulma Small  IIDYL'B Date: 2/15/2020      OT orders, RN SEGUNDO recommended to hold therapy at this time secondary to increased bleeding on surgical site  OT will continue to follow and evaluate as appropriate

## 2020-02-15 NOTE — QUICK NOTE
Pt is a 53year old male who presented s/p skiing accident with noted left tibial shaft fracture  He was evaluated by orthopedics and taken to the OR today  Pt was noted to be in the OR throughout a majority of the day  Continues to be in the OR at this time  Day SLIM team unable to assess the patient  SLIM to assess in the AM once patient returns to the floor  Chart was personally reviewed  Continue home medications for hypothyroid and GERD  Does have elevated cr  At 1 72, receiving lactated ringers per ortho  Monitor       Fritz Kaufman PA-C

## 2020-02-15 NOTE — ASSESSMENT & PLAN NOTE
· Fracture suffered while skiing, patient is incredibly active and POD#1 (2/14/20)  Tibial IM nailing, ORIF fibula fracture  · Continue pain regimen  · Scheduled Tylenol, p r n   IV Dilaudid increase to 1 mg, scheduled gabapentin  · Apply ice to affected area  · Elevate leg  · Patient resumed on aspirin, will discuss with orthopedics regarding DVT prophylaxis  · 100% Nonweightbearing, will need orthopedic follow-up in [facility] upon return home  · Patient reports he is supposed to fly back to [place] tomorrow, his pain is too severe for discharge today and he does have some postoperative bleeding which needs to be monitored

## 2020-02-15 NOTE — ANESTHESIA POSTPROCEDURE EVALUATION
Post-Op Assessment Note    CV Status:  Stable       Mental Status:  Arousable   Hydration Status:  Stable   PONV Controlled:  None   Airway Patency:  Patent   Post Op Vitals Reviewed: Yes      Staff: CRNA           BP   130/62   Temp   99 3   Pulse 98   Resp   13   SpO2   100% on 6LFM   Postop VS in PACU noted above, SV non-obstructed  Pt says he still has pain but improving since initially asking after extubation

## 2020-02-15 NOTE — PLAN OF CARE
Problem: PHYSICAL THERAPY ADULT  Goal: Performs mobility at highest level of function for planned discharge setting  See evaluation for individualized goals  Description  Treatment/Interventions: Functional transfer training, LE strengthening/ROM, Elevations, Therapeutic exercise, Endurance training, Patient/family training, Equipment eval/education, Bed mobility, Gait training, Spoke to nursing, Family          See flowsheet documentation for full assessment, interventions and recommendations  Note:   Prognosis: Good  Problem List: Decreased strength, Decreased endurance, Impaired balance, Decreased mobility, Decreased skin integrity, Orthopedic restrictions, Pain  Assessment: pt is a 43y/o m who presents to [facility] c L tibia/fibula fx s/p skiing accident  s/p L LE ORIF 2/14/20  ordered NWB L LE  PMH significant for hypothyroidism, GERD, + CKD III  at baseline, pt (I) c functional mobility tasks s AD  resides c spouse in ranch home c 1 BLANQUITA  currently presents c deficits in strength, balance, gait quality, pain, NWB L LE, + activity tolerance noted in PT exam above  Barthel Index 55/100  pt able to maintain NWB L LE c OOB mobility 100% of the time  ambulated 40' c crutches s overt loss of balance  pt educated on negotiation of curb step c crutches c good verbal understanding  pt issued crutches for home use  would benefit from skilled PT to maximize functional mobility + return home safely  upon d/c, recommend pt return home c family support once medically cleared by MD  PT eval of high complexity 2* unstable med status c pt requiring ongoing medical management s/p L LE ORIF  pt NWB L LE  reports 8/10 pain c limited mobility 2* above  Barriers to Discharge: Inaccessible home environment     Recommendation: Home with family support     PT - OK to Discharge: Yes    See flowsheet documentation for full assessment

## 2020-02-15 NOTE — DISCHARGE INSTRUCTIONS
Leg Fracture   WHAT YOU NEED TO KNOW:   A leg fracture is a break in any of the 3 long bones of your leg  The femur is the largest bone and goes from your hip to your knee  The fibula and tibia are the 2 bones in your lower leg that go from your knee to your ankle  DISCHARGE INSTRUCTIONS:   Seek care immediately if:   · Your leg feels warm, tender, and painful  It may look swollen and red  · The pain in your injured leg gets worse even after you rest and take medicine  · Your cast gets wet or damaged  · Your leg or toes are numb  · The skin or toes of your injured leg become swollen, cold, or blue  Contact your healthcare provider or bone specialist if:   · You have a fever  · Your cast or brace is too tight  · There are new blood stains or a bad smell coming from under the cast     · You have new or worsening trouble moving your leg  · You have questions or concerns about your condition or care  Medicines:   · Prescription pain medicine  may be given  Ask how to take this medicine safely  · NSAIDs , such as ibuprofen, help decrease swelling, pain, and fever  NSAIDs can cause stomach bleeding or kidney problems in certain people  If you take blood thinner medicine, always ask your healthcare provider if NSAIDs are safe for you  Always read the medicine label and follow directions  · Acetaminophen  decreases pain and fever  It is available without a doctor's order  Ask how much to take and how often to take it  Follow directions  Read the labels of all other medicines you are using to see if they also contain acetaminophen, or ask your doctor or pharmacist  Acetaminophen can cause liver damage if not taken correctly  Do not use more than 4 grams (4,000 milligrams) total of acetaminophen in one day  · Take your medicine as directed  Contact your healthcare provider if you think your medicine is not helping or if you have side effects   Tell him of her if you are allergic to any medicine  Keep a list of the medicines, vitamins, and herbs you take  Include the amounts, and when and why you take them  Bring the list or the pill bottles to follow-up visits  Carry your medicine list with you in case of an emergency  Cast or splint care:   · Check the skin around your cast and splint daily for any redness or open areas  · Do not use a sharp or pointed object to scratch your skin under the cast or splint  · Do not remove your splint unless your healthcare provider says it is okay  Bathing with a cast or splint:  Do not let your cast or splint get wet  Before bathing, cover the cast or splint with a plastic bag  Tape the bag to your skin above the cast or splint to seal out the water  Keep your leg out of the water in case the bag leaks  Ask when it is okay to take a bath or shower  Self-care:   · Rest  your leg as directed and avoid activities that cause leg pain  · Apply ice  on your leg for 15 to 20 minutes every hour or as directed  Use an ice pack, or put crushed ice in a plastic bag  Cover it with a towel  Ice helps prevent tissue damage and decreases swelling and pain  · Elevate  your leg above the level of your heart as often as you can  This will help decrease swelling and pain  Prop your leg on pillows or blankets to keep it elevated comfortably  · Use crutches or a walker  as directed  Crutches will help you walk and take some weight off your injured leg while it heals  · Physical therapy  may be recommended  A physical therapist teaches you exercises to help improve movement and strength, and to decrease pain  Follow up with your healthcare provider or bone specialist as directed: You may need to return to have your splint or cast removed  You may need an x-ray of your leg to check how well the bone has healed  Write down your questions so you remember to ask them during your visits    © 2017 [employer] [employer] Information is for End User's use only and may not be sold, redistributed or otherwise used for commercial purposes  All illustrations and images included in CareNotes® are the copyrighted property of [employer]  or Howie Oconnor  The above information is an  only  It is not intended as medical advice for individual conditions or treatments  Talk to your doctor, nurse or pharmacist before following any medical regimen to see if it is safe and effective for you  Hydromorphone (By mouth)   Hydromorphone (aql-tfeg-WRO-fone)  Treats moderate to severe pain  This medicine is a narcotic pain reliever  Brand Name(s): Dilaudid, Exalgo   There may be other brand names for this medicine  When This Medicine Should Not Be Used: This medicine is not right for everyone  Do not use it if you had an allergic reaction to hydromorphone or sulfites, or if you have severe lung or breathing problems, or stomach or bowel blockage (including paralytic ileus)  How to Use This Medicine:   Long Acting Capsule, Liquid, Tablet, Long Acting Tablet  · Take your medicine as directed  Your dose may need to be changed several times to find what works best for you  An overdose can be dangerous  Follow directions carefully so you do not get too much medicine at one time  · Oral liquid: Measure the oral liquid medicine with a marked measuring spoon, oral syringe, or medicine cup  If you get any of the oral liquid on your skin, rinse it with cool water right away  · Swallow the extended-release capsule whole  Do not crush, break, or chew it  · Swallow the extended-release tablet whole  Do not crush, break, or chew it  · If you take the extended-release tablet, part of the tablet may pass into your stools  This is normal and is nothing to worry about  · Hydromorphone extended-release capsules or tablets work differently than regular hydromorphone tablets, even at the same dose   Do not switch from one form to another unless your doctor tells you to   · This medicine should come with a Medication Guide  Ask your pharmacist for a copy if you do not have one  · Missed dose:   ¨ Extended-release capsules or tablets: If you miss a dose, skip the missed dose and take your next dose at the usual time the next day  Do not double doses  ¨ Oral liquid: Take a dose as soon as you remember  If it is almost time for your next dose, wait until then and take a regular dose  Do not take extra medicine to make up for a missed dose  · Store the medicine in a closed container at room temperature, away from heat, moisture, and direct light  Store the medicine in a safe and secure place  Do not throw unused medicine in the trash  Ask your pharmacist about the best way to dispose of medicine you do not use  Drugs and Foods to Avoid:   Ask your doctor or pharmacist before using any other medicine, including over-the-counter medicines, vitamins, and herbal products  · Do not use this medicine if you are using or have used an MAO inhibitor within the past 14 days  · Some medicines can affect how hydromorphone works  Tell your doctor if you are using any of the following:   ¨ Blood pressure medicine  ¨ Diuretic (water pill)  ¨ Medicine to treat depression  ¨ Phenothiazine medicine  · Do not drink alcohol while you are using this medicine  · Tell your doctor if you use anything else that makes you sleepy  Some examples are allergy medicine, narcotic pain medicine, and alcohol  Tell your doctor if you are also using buprenorphine, butorphanol, nalbuphine, pentazocine, or a muscle relaxer  Warnings While Using This Medicine:   · Tell your doctor if you are pregnant or breastfeeding, or if you have kidney disease, liver disease, lung disease or breathing problems (such as asthma or COPD), an underactive thyroid, adrenal problems, cystic fibrosis, pancreas problems, gallbladder problems, an enlarged prostate, trouble urinating, or stomach or bowel problems   Tell your doctor if you have a history of head injury, brain tumor, seizures, depression, or alcohol or drug abuse  · This medicine may cause the following problems:  ¨ High risk of overdose, which can lead to death  ¨ Respiratory depression (serious breathing problem that can be life-threatening)  ¨ Serotonin syndrome, when used with certain medicines  · This medicine can be habit-forming  Do not use more than your prescribed dose  Call your doctor if you think your medicine is not working  · Do not stop using this medicine suddenly  Your doctor will need to slowly decrease your dose before you stop it completely  · This medicine may make you dizzy, drowsy, or lightheaded  Do not drive or do anything else that could be dangerous until you know how this medicine affects you  Sit or lie down if you feel dizzy  Stand up carefully  · This medicine could cause infertility  Talk with your doctor before using this medicine if you plan to have children  · This medicine may cause constipation, especially with long-term use  Ask your doctor if you should use a laxative to prevent and treat constipation  · Keep all medicine out of the reach of children  Never share your medicine with anyone    Possible Side Effects While Using This Medicine:   Call your doctor right away if you notice any of these side effects:  · Allergic reaction: Itching or hives, swelling in your face or hands, swelling or tingling in your mouth or throat, chest tightness, trouble breathing  · Anxiety, restlessness, fast heartbeat, fever, sweating, muscle spasms, twitching, nausea, vomiting, diarrhea, seeing or hearing things that are not there  · Blue lips, fingernails, or skin  · Extreme dizziness or weakness, shallow breathing, slow or uneven heartbeat, sweating, cold or clammy skin, seizures  · Severe confusion, lightheadedness, dizziness, fainting  · Severe constipation, stomach pain, or vomiting  · Trouble breathing or slow breathing  If you notice these less serious side effects, talk with your doctor:   · Mild constipation, nausea, or vomiting  · Mild sleepiness or tiredness  If you notice other side effects that you think are caused by this medicine, tell your doctor  Call your doctor for medical advice about side effects  You may report side effects to FDA at 4-672-FDA-4576  © 2017 [address] Information is for End User's use only and may not be sold, redistributed or otherwise used for commercial purposes  The above information is an  only  It is not intended as medical advice for individual conditions or treatments  Talk to your doctor, nurse or pharmacist before following any medical regimen to see if it is safe and effective for you  Deep Vein Thrombosis Prevention   WHAT YOU NEED TO KNOW:   Deep vein thrombosis (DVT) is a blood clot that forms in a deep vein of the body  The deep veins in the legs, thighs, and hips are the most common sites for DVT  DVT can also occur in your arms  The clot prevents the normal flow of blood in the vein  The blood backs up and causes pain and swelling  The DVT can break into smaller pieces and travel to your lungs and cause a blockage called a pulmonary embolism  A pulmonary embolism can become life-threatening  DISCHARGE INSTRUCTIONS:   Call 911 for any of the following:   · You feel lightheaded, short of breath, and have chest pain  · You cough up blood  Seek care immediately if:   · Your arm or leg feels warm, tender, and painful  It may look swollen and red  Contact your healthcare provider if:   · You have questions or concerns about your condition or care  Risk factors for DVT:  A DVT can happen to anybody, but certain things can increase your risk  You may be at higher risk if you have had DVT in the past  You may also be at risk if you have a family member who has had blood clots   The following conditions also increase your risk:  · Limited activity caused by bed rest, a leg cast, or sitting for long periods    · Injury to a deep vein, or surgery    · A blood disorder that makes your blood clot faster than normal, such as factor V Leiden mutation    · Age older than 60 years    · Use of hormone replacement therapy or some types of birth control medicine    · Pregnancy, and for 6 weeks after childbirth     · Cancer or heart failure     · A catheter placed in a large vein    · Smoking    · Obesity or varicose veins  Prevent DVT:   · Guidelines for everyone:      ¨ Maintain a healthy weight  Ask your healthcare provider how much you should weigh  Ask him to help you create a weight loss plan if you are overweight  ¨ Do not smoke  Nicotine and other chemicals in cigarettes and cigars can damage blood vessels and increase your risk for a DVT  Ask your healthcare provider for information if you currently smoke and need help to quit  E-cigarettes or smokeless tobacco still contain nicotine  Talk to your healthcare provider before you use these products  ¨ Move regularly if you sit for long periods of time  If you travel by car or work at a desk, move and stretch in your seat several times each hour  In an airplane, get up and walk every hour  Exercise your legs while sitting by raising and lowering your heels  Keep your toes on the floor while you do this  You can also raise and lower your toes while keeping your heels on the floor  Also tighten and release your leg muscles while sitting  ¨ Exercise regularly  to help increase your blood flow  Walking is a good low-impact exercise  Talk to your healthcare provider about the best exercise plan for you  · Guidelines for people at high risk for DVT:      ¨ Take blood thinner medicines as directed  Your healthcare provider may recommend blood thinners and other medicines to help prevent blood clots  ¨ Wear pressure stockings as directed  The stockings are tight and put pressure on your legs   This improves blood flow and helps prevent clots  Wear the stockings during the day  Do not wear them when you sleep  ¨ Elevate your legs  above the level of your heart as often as you can  This will help decrease swelling and pain  Prop your legs on pillows or blankets to keep them elevated comfortably  ¨ Get up and move as directed after surgery or an injury, or during an illness  Early and regular movement can help decrease your risk for DVT by helping to increase your blood flow  Ask your healthcare provider what type of activity you need and how often you should do it  ¨ Change body positions often if you are bedridden  Ask for help to change your position every 1 to 2 hours  Follow up with your healthcare provider as directed:  Write down your questions so you remember to ask them during your visits  © 2017 [address] Information is for End User's use only and may not be sold, redistributed or otherwise used for commercial purposes  All illustrations and images included in CareNotes® are the copyrighted property of [employer]  or Howie Anastasia  The above information is an  only  It is not intended as medical advice for individual conditions or treatments  Talk to your doctor, nurse or pharmacist before following any medical regimen to see if it is safe and effective for you  Gabapentin (By mouth)   Gabapentin (jony-a-PEN-tin)  Treats seizures and pain caused by shingles  Brand Name(s): ACTIVE-PAC with Gabapentin, Convenience Raul, Cyclo/Abel 10/300 Pack, FusePaq Fanatrex, Abel-V, Gralise, [address] Pack, Neurontin, SmartRx Abel Kit   There may be other brand names for this medicine  When This Medicine Should Not Be Used: This medicine is not right for everyone  Do not use it if you had an allergic reaction to gabapentin  How to Use This Medicine:   Capsule, Liquid, Tablet  · Take your medicine as directed   Your dose may need to be changed several times to find what works best for you  If you have epilepsy, do not allow more than 12 hours to pass between doses  · Capsule: Swallow the capsule whole with plenty of water  Do not open, crush, or chew it  · Gralise® tablet: Swallow the tablet whole   Do not crush, break, or chew it  · Neurontin® tablet: If you break a tablet into 2 pieces, use the second half as your next dose  If you don't use it within 28 days, throw it away  · Measure the oral liquid medicine with a marked measuring spoon, oral syringe, or medicine cup  · This medicine should come with a Medication Guide  Ask your pharmacist for a copy if you do not have one  · Missed dose: Take a dose as soon as you remember  If it is almost time for your next dose, wait until then and take a regular dose  Do not take extra medicine to make up for a missed dose  · Store the medicine in a closed container at room temperature, away from heat, moisture, and direct light  Store the Neurontin® oral liquid in the refrigerator  Do not freeze  Drugs and Foods to Avoid:   Ask your doctor or pharmacist before using any other medicine, including over-the-counter medicines, vitamins, and herbal products  · Some medicines can affect how gabapentin works  Tell your doctor if you also use any of the following:   ¨ Hydrocodone  ¨ Morphine  · If you take an antacid, wait at least 2 hours before you take gabapentin  · Tell your doctor if you use anything else that makes you sleepy  Some examples are allergy medicine, narcotic pain medicine, and alcohol  Warnings While Using This Medicine:   · Tell your doctor if you are pregnant or breastfeeding, or if you have kidney problems or are receiving dialysis  Tell your doctor if you have a history of depression or mental health problems  · This medicine may increase depression or thoughts of suicide  Tell your doctor right away if you start to feel more depressed or think about hurting yourself    · This medicine may cause a serious allergic reaction called multiorgan hypersensitivity, which can damage organs and be life-threatening  · Do not stop using this medicine suddenly  Your doctor will need to slowly decrease your dose before you stop it completely  If you take this medicine to prevent seizures, your seizures may return or occur more often if you stop this medicine suddenly  · This medicine may make you dizzy or drowsy  Do not drive or do anything else that could be dangerous until you know how this medicine affects you  · Tell any doctor or dentist who treats you that you are using this medicine  This medicine may affect certain medical test results  · Your doctor will check your progress and the effects of this medicine at regular visits  Keep all appointments  · Keep all medicine out of the reach of children  Never share your medicine with anyone    Possible Side Effects While Using This Medicine:   Call your doctor right away if you notice any of these side effects:  · Allergic reaction: Itching or hives, swelling in your face or hands, swelling or tingling in your mouth or throat, chest tightness, trouble breathing  · Behavior problems, aggression, restlessness, trouble concentrating, moodiness (especially in children)  · Blistering, peeling, red skin rash  · Change in how much or how often you urinate, bloody or cloudy urine,  · Chest pain, fast heartbeat, trouble breathing  · Dark urine or pale stools, nausea, vomiting, loss of appetite, stomach pain, yellow skin or eyes  · Fever, rash, swollen or tender glands in the neck, armpit, or groin  · Problems with coordination, shakiness, unsteadiness  · Rapid weight gain, swelling in your hands, ankles, or feet  · Unusual moods or behaviors, thoughts of hurting yourself, feeling depressed  If you notice these less serious side effects, talk with your doctor:   · Dizziness, drowsiness, sleepiness, tiredness  If you notice other side effects that you think are caused by this medicine, tell your doctor  Call your doctor for medical advice about side effects  You may report side effects to FDA at 6-961-FDA-4767  © 2017 [address] Information is for End User's use only and may not be sold, redistributed or otherwise used for commercial purposes  The above information is an  only  It is not intended as medical advice for individual conditions or treatments  Talk to your doctor, nurse or pharmacist before following any medical regimen to see if it is safe and effective for you

## 2020-02-15 NOTE — PROGRESS NOTES
Progress Note - Marcia Dire 1975, 53 y o  male MRN: [id number]    Unit/Bed#: -01 Encounter: [id number]    Primary Care Provider: No primary care provider on file  Date and time admitted to hospital: 2/13/2020  8:57 PM      * Closed fracture of left tibia and fibula  Assessment & Plan  · Fracture suffered while skiing, patient is incredibly active and POD#1 (2/14/20)  Tibial IM nailing, ORIF fibula fracture  · Continue pain regimen  · Scheduled Tylenol, p r n  IV Dilaudid increase to 1 mg, scheduled gabapentin  · Apply ice to affected area  · Elevate leg  · Patient resumed on aspirin, will discuss with orthopedics regarding DVT prophylaxis  · 100% Nonweightbearing, will need orthopedic follow-up in [facility] upon return home  · Patient reports he is supposed to fly back to [place] tomorrow, his pain is too severe for discharge today and he does have some postoperative bleeding which needs to be monitored     Gastroesophageal reflux disease without esophagitis  Assessment & Plan  · Protonix daily    Hypothyroidism  Assessment & Plan  · Levothyroxine daily    Stage 3 chronic kidney disease ([facility] [address] )  Assessment & Plan  · Creatinine may be falsely elevated due to high amount of muscle mass  · Avoid nephrotoxins and hypotension  · Overall stable, creatinine 1 5        VTE Pharmacologic Prophylaxis:   Pharmacologic: Other Medication: aspirin BID  Mechanical VTE Prophylaxis in Place: No    Patient Centered Rounds: I have performed bedside rounds with nursing staff today  Discussions with Specialists or Other Care Team Provider: await ortho reeval today     Education and Discussions with Family / Patient: patient, wife     Time Spent for Care: 30 minutes  More than 50% of total time spent on counseling and coordination of care as described above      Current Length of Stay: 2 day(s)    Current Patient Status: Inpatient   Certification Statement: The patient will continue to require additional inpatient hospital stay due to pain control, monitoring for further postoperative bleeding     Discharge Plan: pending better pain control and stability of postop bleeding     Code Status: Level 1 - Full Code      Subjective:   Patient seen this morning, in severe pain  Has a lot of burning  Reports IV pain meds work for about an hour to an hour and a half, the oral route does not do anything  Had refuse the Tylenol this morning, was unaware the indication for  Did have some postoperative bleeding, he bled through the dressing into these bandage overnight which was redressed  Wolfe remains in place until re-evaluated by Ortho this morning  Patient would like it removed, but is in such severe pain that he is unable to moved to use a urinal at this time  Good urine output per Wolfe, passing flatus but no BM yet  Objective:     Vitals:   Temp (24hrs), Av 4 °F (36 9 °C), Min:97 9 °F (36 6 °C), Max:99 3 °F (37 4 °C)    Temp:  [97 9 °F (36 6 °C)-99 3 °F (37 4 °C)] 98 6 °F (37 °C)  HR:  [] 93  Resp:  [13-20] 18  BP: (111-144)/(62-86) 135/75  SpO2:  [94 %-100 %] 99 %  There is no height or weight on file to calculate BMI  Input and Output Summary (last 24 hours): Intake/Output Summary (Last 24 hours) at 2/15/2020 0855  Last data filed at 2/15/2020 0851  Gross per 24 hour   Intake 2500 ml   Output 3700 ml   Net -1200 ml       Physical Exam:     Physical Exam   Constitutional: Vital signs are normal  He appears well-developed and well-nourished  He appears distressed (Mild)  Cardiovascular: Normal rate, regular rhythm, S1 normal, S2 normal and normal heart sounds  No murmur heard  Palpable popliteal pulse   Pulmonary/Chest: Effort normal and breath sounds normal  No respiratory distress  He has no wheezes  He has no rhonchi  He has no rales  Abdominal: Soft  Bowel sounds are normal  He exhibits no distension  There is no tenderness     Genitourinary:   Genitourinary Comments: Wolfe catheter draining clear yellow urine   Musculoskeletal: He exhibits deformity (left lower extremity dressing intact, sensitive to touch but able to wiggle toes)  He exhibits no edema  Psychiatric: He has a normal mood and affect  Nursing note and vitals reviewed  Additional Data:     Labs:    Results from last 7 days   Lab Units 02/15/20  0543  02/13/20  2333   WBC Thousand/uL 13 57*   < > 12 44*   HEMOGLOBIN g/dL 12 4   < > 15 0   HEMATOCRIT % 38 6   < > 46 2   PLATELETS Thousands/uL 159   < > 184   NEUTROS PCT %  --   --  81*   LYMPHS PCT %  --   --  12*   MONOS PCT %  --   --  6   EOS PCT %  --   --  1    < > = values in this interval not displayed  Results from last 7 days   Lab Units 02/15/20  0543   SODIUM mmol/L 138   POTASSIUM mmol/L 4 0   CHLORIDE mmol/L 101   CO2 mmol/L 32   BUN mg/dL 14   CREATININE mg/dL 1 52*   ANION GAP mmol/L 5   CALCIUM mg/dL 8 1*   GLUCOSE RANDOM mg/dL 127                           * I Have Reviewed All Lab Data Listed Above  * Additional Pertinent Lab Tests Reviewed:  [address] Admission Reviewed    Imaging:    Imaging Reports Reviewed Today Include: CT, XR   Imaging Personally Reviewed by Myself Includes:  XR pre and post op reviewed in room with patient and wife     Recent Cultures (last 7 days):           Last 24 Hours Medication List:     Current Facility-Administered Medications:  acetaminophen 975 mg Oral Q8H [address] V[address] MD   acetaminophen 975 mg Oral Once Mary Cohudhary PA-C   aluminum-magnesium hydroxide-simethicone 30 mL Oral Q6H PRN Luisana Carlos MD   aspirin 325 mg Oral Daily Luisana Carlos MD   gabapentin 100 mg Oral Q8H Erma Flowers MD   HYDROmorphone 1 mg Intravenous Q3H PRN Carmelita Page PA-C   levothyroxine 50 mcg Oral Early Morning Luisana Carlos MD   ondansetron 4 mg Intravenous Q6H PRN Luisana Carlos MD   oxyCODONE 10 mg Oral Q4H PRN Mary Choudhary PA-C   pantoprazole 40 mg Oral Early Morning Luisana Carlos MD Today, Patient Was Seen By: Alfonso Taylor PA-C    ** Please Note: Dictation voice to text software may have been used in the creation of this document   **

## 2020-02-15 NOTE — PROGRESS NOTES
Orthopedics   Enid Díaz 53 y o  male MRN: [id number]  Unit/Bed#: -01    Subjective:  53 y o male post operative day 1 left tibial intramedullary nail and ORIF left mid to distal fibula  Patient had some drainage overnight posterior aspect of the dressing was saturated  Pain has been well controlled  States he has numbness on the top of his foot and on the lateral aspect of the ankle  At times he gets a burning sensation down the leg  Other than that he has been doing well      Labs:  0   Lab Value Date/Time    HCT 38 6 02/15/2020 0543    HCT 43 7 02/14/2020 0412    HCT 46 2 02/13/2020 2333    HGB 12 4 02/15/2020 0543    HGB 14 0 02/14/2020 0412    HGB 15 0 02/13/2020 2333    WBC 13 57 (H) 02/15/2020 0543    WBC 9 82 02/14/2020 0412    WBC 12 44 (H) 02/13/2020 2333       Meds:    Current Facility-Administered Medications:     acetaminophen (TYLENOL) tablet 975 mg, 975 mg, Oral, Q8H [address], Lindsay Shelby MD, 975 mg at 02/14/20 0754    aluminum-magnesium hydroxide-simethicone (MYLANTA) 200-200-20 mg/5 mL oral suspension 30 mL, 30 mL, Oral, Q6H PRN, Lindsay Shelby MD    aspirin tablet 325 mg, 325 mg, Oral, Daily, Lindsay Shelby MD, 325 mg at 02/15/20 0816    dextran 70-hypromellose (GENTEAL TEARS) 0 1-0 3 % ophthalmic solution 1 drop, 1 drop, Both Eyes, Q3H PRN, Mary Choudhary PA-C    gabapentin (NEURONTIN) capsule 100 mg, 100 mg, Oral, Q8H, Venkata Flowers MD, 100 mg at 02/15/20 0608    HYDROmorphone (DILAUDID) injection 1 mg, 1 mg, Intravenous, Q3H PRN, Mary Choudhary PA-C, 1 mg at 02/15/20 1050    levothyroxine tablet 50 mcg, 50 mcg, Oral, Early Morning, Lindsay Shelby MD, 50 mcg at 02/15/20 0607    ondansetron (ZOFRAN) injection 4 mg, 4 mg, Intravenous, Q6H PRN, Lindsay Shelby MD    oxyCODONE (ROXICODONE) immediate release tablet 10 mg, 10 mg, Oral, Q4H PRN, aMry Choudhary PA-C    pantoprazole (PROTONIX) EC tablet 40 mg, 40 mg, Oral, Early Morning, Venkata Flowers MD, 40 mg at 02/15/20 0608    Blood Culture:   No results found for: BLOODCX    Wound Culture:   No results found for: WOUNDCULT    Ins and Outs:  I/O last 24 hours: In: 2500 [I V :2500]  Out: 3700 [Urine:2900; Blood:800]          Physical:  Vitals:    02/15/20 0704   BP: 135/75   Pulse: 93   Resp: 18   Temp: 98 6 °F (37 °C)   SpO2: 99%     left lower extremity  · Posterior aspect dressing soiled  Dressing removed all incisions look clean dry and intact with sutures in place  No active drainage noted  No erythema no signs of infection  · Calf is soft and nontender  · Range of motion and strength both deferred secondary to recent surgery  · Sensation is intact to light touch left lower extremity with exception of a numbness sensation on the top of the foot and lateral distal fibula  · Patient is able to move all toes and flex and extend knee    Assessment:   44 y o male POD #1 tibial intramedullary nail and ORIF mid to distal fibula  Dressing change performed today  Placed posterior and stirrup splint back in place  Patient planning on flying back home to [place] tomorrow  He will start aspirin 325 mg twice daily for the next seven days and then go to once daily for total of 30 days from date of surgery    He will follow up with orthopedic surgeon down in [address] [facility]:  · NWB left lower extremity with stirrup and posterior splint  · Up and out of bed  · Ice, Elevation to affected extremity  · Analgesics as needed  · PT/OT evaluate and treat  · Patient would benefit from having wheelchair bring back home to [place]   · Dispo: Dilshad Mishra for discharge from ortho perspective  · Will continue to assess for acute blood loss anemia   · Patient will follow up with an orthopedist in [place] for all postoperative care    Consolidated DULCE MARIA Conde

## 2020-02-15 NOTE — ASSESSMENT & PLAN NOTE
· Creatinine may be falsely elevated due to high amount of muscle mass  · Avoid nephrotoxins and hypotension  · Overall stable, creatinine 1 5

## 2020-02-15 NOTE — PHYSICAL THERAPY NOTE
PT Evaluation (19min)  (11:55-12:14)    Past Medical History:   Diagnosis Date    Disease of thyroid gland     GERD (gastroesophageal reflux disease)         02/15/20 1214   Note Type   Note type Eval only   Pain Assessment   Pain Assessment 0-10   Pain Score 8   Pain Type Surgical pain   Pain Location Leg   Pain Orientation Left   Hospital Pain Intervention(s) Ambulation/increased activity   Home Living   Type of [address] One level;Stairs to enter without rails  (1 BLANQUITA)   Prior Function   Level of [place] Independent with ADLs and functional mobility   Lives With Spouse; Family   Receives Help From Family   ADL Assistance Independent   IADLs Independent   Falls in the last 6 months 0   Vocational Full time employment   Comments (+) drives   Restrictions/Precautions   Weight Bearing Precautions Per Order Yes   LLE Weight Bearing Per Order NWB   Other Precautions Fall Risk;Pain;WBS  (NWB L LE)   General   Additional Pertinent History pt presents to [facility] c L tibia/fibula fx s/p skiing accident  s/p L LE ORIF 2/14/20  ordered NWB L LE  PT consulted for mobility + d/c planning  up c (A)  Family/Caregiver Present Yes  (pt's wife)   Cognition   Orientation Level Oriented X4   RUE Assessment   RUE Assessment WFL  (5/5)   LUE Assessment   LUE Assessment WFL  (5/5)   RLE Assessment   RLE Assessment WFL  (4+/5)   LLE Assessment   LLE Assessment WFL  (grossly 3/5 functionally)   Coordination   Sensation WFL   Bed Mobility   Supine to Sit 7  Independent   Transfers   Sit to Stand 5  Supervision   Additional items Verbal cues; Increased time required  (maintains NWB L % of the time)   Stand to Sit 5  Supervision   Additional items Verbal cues; Increased time required  (maintains NWB L % of the time)   Ambulation/Elevation   Gait pattern   (hop to c crutches)   Gait Assistance 5  Supervision   Additional items Verbal cues  (maintains NWB L % of the time)   Assistive Device Crutches Distance 36'   Stair Management Assistance   (pt instructed on curb step c crutches )   Balance   Static Sitting Good   Dynamic Sitting Good   Static Standing Fair   Dynamic Standing Fair   Ambulatory Fair   Activity Tolerance   Activity Tolerance Patient limited by pain   Nurse Made Aware RN Rani aware pt ambulated c (S) + crutches  Assessment   Prognosis Good   Problem List Decreased strength;Decreased endurance; Impaired balance;Decreased mobility; Decreased skin integrity;Orthopedic restrictions;Pain   Assessment pt is a 43y/o m who presents to [facility] c L tibia/fibula fx s/p skiing accident  s/p L LE ORIF 2/14/20  ordered NWB L LE  PMH significant for hypothyroidism, GERD, + CKD III  at baseline, pt (I) c functional mobility tasks s AD  resides c spouse in ranch home c 1 BLANQUITA  currently presents c deficits in strength, balance, gait quality, pain, NWB L LE, + activity tolerance noted in PT exam above  Barthel Index 55/100  pt able to maintain NWB L LE c OOB mobility 100% of the time  ambulated 40' c crutches s overt loss of balance  pt educated on negotiation of curb step c crutches c good verbal understanding  pt issued crutches for home use  would benefit from skilled PT to maximize functional mobility + return home safely  upon d/c, recommend pt return home c family support once medically cleared by MD  PT eval of high complexity 2* unstable med status c pt requiring ongoing medical management s/p L LE ORIF  pt NWB L LE  reports 8/10 pain c limited mobility 2* above  Barriers to Discharge Inaccessible home environment   Goals   Patient Goals "to go skiing"  [facility] Expiration Date 02/22/20   Short Term Goal #1 1   increase strength 1/2 grade to improve overall functional mobility, 2  perform transfers mod (I) while maintaining NWB L % of the time to safely perform ADLs, 3  ambulate 100' mod (I) c crutches while maintaining NWB L % of the time to safely navigate home environment, 4  negotiate 1 curb step mod (I) while maintaining NWB L % of the time to safely enter home   Plan   Treatment/Interventions Functional transfer training;LE strengthening/ROM; Elevations; Therapeutic exercise; Endurance training;Patient/family training;Equipment eval/education; Bed mobility;Gait training;Spoke to nursing;Family   PT Frequency 2-3x/wk   Recommendation   Recommendation Home with family support   PT - OK to Discharge Yes   Modified Lincoln Scale   Modified Lincoln Scale 3   Barthel Index   Feeding 10   Bathing 0   Grooming Score 5   Dressing Score 5   Bladder Score 10   Bowels Score 10   Toilet Use Score 5   Transfers (Bed/Chair) Score 10   Mobility (Level Surface) Score 0   Stairs Score 0   Barthel Index Score 55     Lety Arguello, PT, DPT

## 2020-02-16 LAB
ANION GAP SERPL CALCULATED.3IONS-SCNC: 4 MMOL/L (ref 4–13)
BUN SERPL-MCNC: 13 MG/DL (ref 5–25)
CALCIUM SERPL-MCNC: 8.1 MG/DL (ref 8.3–10.1)
CHLORIDE SERPL-SCNC: 104 MMOL/L (ref 100–108)
CO2 SERPL-SCNC: 33 MMOL/L (ref 21–32)
CREAT SERPL-MCNC: 1.4 MG/DL (ref 0.6–1.3)
GFR SERPL CREATININE-BSD FRML MDRD: 60 ML/MIN/1.73SQ M
GLUCOSE SERPL-MCNC: 109 MG/DL (ref 65–140)
POTASSIUM SERPL-SCNC: 3.9 MMOL/L (ref 3.5–5.3)
SODIUM SERPL-SCNC: 141 MMOL/L (ref 136–145)

## 2020-02-16 PROCEDURE — 99233 SBSQ HOSP IP/OBS HIGH 50: CPT | Performed by: PHYSICIAN ASSISTANT

## 2020-02-16 PROCEDURE — 80048 BASIC METABOLIC PNL TOTAL CA: CPT | Performed by: ORTHOPAEDIC SURGERY

## 2020-02-16 RX ORDER — FAMOTIDINE 20 MG/1
20 TABLET, FILM COATED ORAL 2 TIMES DAILY PRN
Status: DISCONTINUED | OUTPATIENT
Start: 2020-02-16 | End: 2020-02-17 | Stop reason: HOSPADM

## 2020-02-16 RX ORDER — HYDROMORPHONE HYDROCHLORIDE 2 MG/1
1 TABLET ORAL
Status: DISCONTINUED | OUTPATIENT
Start: 2020-02-16 | End: 2020-02-17 | Stop reason: HOSPADM

## 2020-02-16 RX ORDER — HYDROMORPHONE HYDROCHLORIDE 2 MG/1
2 TABLET ORAL
Status: DISCONTINUED | OUTPATIENT
Start: 2020-02-16 | End: 2020-02-17 | Stop reason: HOSPADM

## 2020-02-16 RX ORDER — GABAPENTIN 100 MG/1
100 CAPSULE ORAL ONCE
Status: COMPLETED | OUTPATIENT
Start: 2020-02-16 | End: 2020-02-16

## 2020-02-16 RX ADMIN — POLYETHYLENE GLYCOL 3350 17 G: 17 POWDER, FOR SOLUTION ORAL at 13:29

## 2020-02-16 RX ADMIN — DOCUSATE SODIUM 100 MG: 100 CAPSULE, LIQUID FILLED ORAL at 08:10

## 2020-02-16 RX ADMIN — GABAPENTIN 200 MG: 100 CAPSULE ORAL at 05:18

## 2020-02-16 RX ADMIN — HYDROMORPHONE HYDROCHLORIDE 2 MG: 2 TABLET ORAL at 17:56

## 2020-02-16 RX ADMIN — ACETAMINOPHEN 975 MG: 325 TABLET, FILM COATED ORAL at 21:59

## 2020-02-16 RX ADMIN — HYDROMORPHONE HYDROCHLORIDE 1 MG: 1 INJECTION, SOLUTION INTRAMUSCULAR; INTRAVENOUS; SUBCUTANEOUS at 01:51

## 2020-02-16 RX ADMIN — ASPIRIN 325 MG ORAL TABLET 325 MG: 325 PILL ORAL at 08:10

## 2020-02-16 RX ADMIN — DOCUSATE SODIUM 100 MG: 100 CAPSULE, LIQUID FILLED ORAL at 17:04

## 2020-02-16 RX ADMIN — ACETAMINOPHEN 975 MG: 325 TABLET, FILM COATED ORAL at 05:18

## 2020-02-16 RX ADMIN — LEVOTHYROXINE SODIUM 50 MCG: 50 TABLET ORAL at 05:19

## 2020-02-16 RX ADMIN — ACETAMINOPHEN 975 MG: 325 TABLET, FILM COATED ORAL at 13:24

## 2020-02-16 RX ADMIN — ASPIRIN 325 MG ORAL TABLET 325 MG: 325 PILL ORAL at 21:59

## 2020-02-16 RX ADMIN — HYDROMORPHONE HYDROCHLORIDE 2 MG: 2 TABLET ORAL at 08:11

## 2020-02-16 RX ADMIN — GABAPENTIN 200 MG: 100 CAPSULE ORAL at 21:59

## 2020-02-16 RX ADMIN — HYDROMORPHONE HYDROCHLORIDE 1 MG: 1 INJECTION, SOLUTION INTRAMUSCULAR; INTRAVENOUS; SUBCUTANEOUS at 20:00

## 2020-02-16 RX ADMIN — GABAPENTIN 100 MG: 100 CAPSULE ORAL at 09:08

## 2020-02-16 RX ADMIN — HYDROMORPHONE HYDROCHLORIDE 2 MG: 2 TABLET ORAL at 14:53

## 2020-02-16 RX ADMIN — HYDROMORPHONE HYDROCHLORIDE 2 MG: 2 TABLET ORAL at 00:55

## 2020-02-16 RX ADMIN — PANTOPRAZOLE SODIUM 40 MG: 40 TABLET, DELAYED RELEASE ORAL at 05:19

## 2020-02-16 RX ADMIN — GABAPENTIN 200 MG: 100 CAPSULE ORAL at 13:24

## 2020-02-16 RX ADMIN — HYDROMORPHONE HYDROCHLORIDE 2 MG: 2 TABLET ORAL at 11:49

## 2020-02-16 NOTE — DISCHARGE INSTR - AVS FIRST PAGE
1  100% nonweightbearing status the left leg until cleared by orthopod  2  Be as active as your leg will allow you, without significantly increasing pain within your limits  3  Continue scheduled Tylenol for the next week until your pain is much better controlled  4  Seek emergent medical care for any concerns of bleeding, uncontrolled pain, severe swelling, shortness of breath or chest pain  5   Continue to ice and elevate the leg as much as possible

## 2020-02-16 NOTE — DISCHARGE SUMMARY
Discharge- Emile Arreola 1975, 53 y o  male MRN: [id number]    Unit/Bed#: -01 Encounter: [id number]    Primary Care Provider: No primary care provider on file  Date and time admitted to hospital: 2/13/2020  8:57 PM      * Closed fracture of left tibia and fibula  Assessment & Plan  · Fracture suffered while skiing, patient is incredibly active and POD#2 (2/14/20)  Tibial IM nailing, ORIF fibula fracture  · Continue pain regimen, has been increased due to uncontrolled pain  · Scheduled Tylenol t i d , scheduled gabapentin 200 mg t i d , Dilaudid oral 1-2 mg q 3 hours  · Apply ice to affected area, elevate leg  · Patient resumed on full strength aspirin twice daily, continue for 1 week postop and then daily for 1 month for DVT prophylaxis  · 100% Nonweightbearing, will need orthopedic follow-up in [place] upon return home (planning to follow Vaibhav Fenton)  · Patient initially scheduled to fly back for to 2/16, they were able to postpone the flight and are hoping to get better pain control this weekend with a flight out 2/17  · Prescriptions have been sent to local pharmacy, wife to  2/16  · Wheelchair and crutches for discharge have been delivered    Stage 3 chronic kidney disease ([facility] [address] )  Assessment & Plan  · Creatinine may be falsely elevated due to high amount of muscle mass  · Avoid nephrotoxins and hypotension  · Overall stable, creatinine 1 4    Hypothyroidism  Assessment & Plan  · Levothyroxine daily    Gastroesophageal reflux disease without esophagitis  Assessment & Plan  · Protonix daily        Discharging Physician / Practitioner: MAYNOR Vasquez   PCP: No primary care provider on file    Admission Date:   Admission Orders (From admission, onward)     Ordered        02/13/20 2232  Inpatient Admission  Once                   Discharge Date: 02/17/2020    Resolved Problems  Date Reviewed: 2/16/2020    None          Consultations During [facility] Stay:  · Orthopedics  · Case management  · PT/OT    Procedures Performed:   · Left tibial IM nailing, fibular ORIF    Significant Findings / Test Results:   · Left tib/fib fracture with comminuted fragments  · Elevated creatinine, suspect baseline due to high muscle mass    Incidental Findings:   · None     Test Results Pending at Discharge (will require follow up): · None     Outpatient Tests Requested:  · Establish care with orthopedics upon return to [place]  · Follow-up with PCP    Complications:  Pain control    Reason for Admission:  Tib-fib fracture    Hospital Course:     Kian Cabrera is a 53 y o  male patient who originally presented to the hospital on 2/13/2020 due to left leg pain  Patient visiting the area from [place] for skiing trip with his family and friends  Reports he was on the slopes, fell to the right but his left leg went to the left any felt and heard a pop  Patient was brought to the ER for evaluation where he was found to have significant fracture of the left lower extremity  Orthopedics consulted and he underwent operative correction 2/14  Postoperatively, patient did have difficulty with pain management  He is being discharged on a regimen of scheduled Tylenol, gabapentin, Dilaudid if needed  He is recommended to continue with ice and elevation, 100% nonweightbearing status to the leg for at least 4-6 weeks  Patient has been cleared by orthopedics for discharge home and flight to [place]  He will utilize a compression stocking to the right lower extremity, will continue with full-strength aspirin twice daily x1 week for DVT prophylaxis followed by once daily thereafter for 1 month  He will establish with his orthopedic surgeon in [place] upon return  Patient's wife is a nurse, I discussed extensively with them monitoring for any DVT/PE signs or symptoms    Patient was provided with a dose of Lovenox 40 mg subcutaneously prior to discharge for increased protection for his flight  Please see above list of diagnoses and related plan for additional information  Condition at Discharge: stable     Discharge Day Visit / Exam:     Subjective:  Patient reported pain is tighter controlled overall wife is at bedside agrees with this finding  Is concerned about DVT prophylaxis long discussion regarding given his flight into our drive home giving him a dose of Lovenox with wife and patient both agreeable this will be given prior to discharge  Concerns and questions answered records given patient overall cleared for discharge patient has no other concerns at this time  Vitals: Blood Pressure: 126/83 (02/17/20 0723)  Pulse: 104 (02/17/20 1127)  Temperature: 99 °F (37 2 °C) (02/17/20 0723)  Temp Source: Oral (02/17/20 0723)  Respirations: 16 (02/17/20 0723)  Height: 6' (182 9 cm) (02/16/20 1500)  Weight - Scale: 102 kg (225 lb) (02/16/20 1500)  SpO2: 97 % (02/17/20 0723)  Exam:   Physical Exam   Constitutional: He is oriented to person, place, and time  He appears well-developed and well-nourished  HENT:   Head: Normocephalic and atraumatic  Eyes: Conjunctivae and EOM are normal    Neck: Normal range of motion  Cardiovascular: Normal rate, regular rhythm and normal heart sounds  Pulmonary/Chest: Effort normal and breath sounds normal    Abdominal: Soft  Bowel sounds are normal    Musculoskeletal:   Dressing intact to left lower extremity cap refill less than 2nd   Neurological: He is alert and oriented to person, place, and time  Skin: Skin is warm and dry  Psychiatric: He has a normal mood and affect  His behavior is normal      Discussion with Family:  Wife, Marilin Mas    Discharge instructions/Information to patient and family:   See after visit summary for information provided to patient and family  Provisions for Follow-Up Care:  See after visit summary for information related to follow-up care and any pertinent home health orders        Disposition: Home    Planned Readmission:  None     Discharge Statement:  I spent 40 minutes discharging the patient  This time was spent on the day of discharge  I had direct contact with the patient on the day of discharge  Greater than 50% of the total time was spent examining patient, answering all patient questions, arranging and discussing plan of care with patient as well as directly providing post-discharge instructions  Additional time then spent on discharge activities  Discharge Medications:  See after visit summary for reconciled discharge medications provided to patient and family        ** Please Note: This note has been constructed using a voice recognition system **

## 2020-02-16 NOTE — SOCIAL WORK
On call page sent to [facility] for request for wc delivery today to pts room  Cm spoke w on call worker who has been trying to contact pts phone for copay  Cm provided her w pt direct line in room to collect payment   They can then deliver wc to room

## 2020-02-16 NOTE — ASSESSMENT & PLAN NOTE
· Creatinine may be falsely elevated due to high amount of muscle mass  · Avoid nephrotoxins and hypotension  · Overall stable, creatinine 1 4

## 2020-02-16 NOTE — ASSESSMENT & PLAN NOTE
· Fracture suffered while skiing, patient is incredibly active and POD#2 (2/14/20)    Tibial IM nailing, ORIF fibula fracture  · Continue pain regimen, has been increased due to uncontrolled pain  · Scheduled Tylenol t i d , scheduled gabapentin 200 mg t i d , Dilaudid oral 1-2 mg q 3 hours  · Apply ice to affected area, elevate leg  · Patient resumed on full strength aspirin twice daily, continue for 1 week postop and then daily for 1 month for DVT prophylaxis  · 100% Nonweightbearing, will need orthopedic follow-up in [place] upon return home (planning to follow Vaibhav Fenton)  · Patient initially scheduled to fly back for to 2/16, they were able to postpone the flight and are hoping to get better pain control this weekend with a flight out 2/17  · Prescriptions have been sent to local pharmacy, wife to  2/16  · Wheelchair and crutches for discharge have been delivered

## 2020-02-16 NOTE — ASSESSMENT & PLAN NOTE
· Fracture suffered while skiing, patient is incredibly active and POD#2 (2/14/20)    Tibial IM nailing, ORIF fibula fracture  · Continue pain regimen, has been increased due to uncontrolled pain  · Scheduled Tylenol t i d , scheduled gabapentin 200 mg t i d , Dilaudid oral 1-2 mg q 3 hours, IV Dilaudid for severe breakthrough pain  · Apply ice to affected area  · Elevate leg  · Patient resumed on full strength aspirin twice daily, continue for 1 week postop and then daily for 1 month  · 100% Nonweightbearing, will need orthopedic follow-up in [place] upon return home  · Patient initially scheduled to fly back for to today, they were able to postpone the flight and are hoping to get better pain control today with a flight out tomorrow 2/17  · Prescriptions have been sent to local pharmacy, wife to  today 2/16  · Case management working on wheelchair obtainment

## 2020-02-16 NOTE — PROGRESS NOTES
Progress Note - Estefania Webster 1975, 53 y o  male MRN: [id number]    Unit/Bed#: -01 Encounter: [id number]    Primary Care Provider: No primary care provider on file  Date and time admitted to hospital: 2/13/2020  8:57 PM      * Closed fracture of left tibia and fibula  Assessment & Plan  · Fracture suffered while skiing, patient is incredibly active and POD#2 (2/14/20)  Tibial IM nailing, ORIF fibula fracture  · Continue pain regimen, has been increased due to uncontrolled pain  · Scheduled Tylenol t i d , scheduled gabapentin 200 mg t i d , Dilaudid oral 1-2 mg q 3 hours, IV Dilaudid for severe breakthrough pain  · Apply ice to affected area  · Elevate leg  · Patient resumed on full strength aspirin twice daily, continue for 1 week postop and then daily for 1 month  · 100% Nonweightbearing, will need orthopedic follow-up in [place] upon return home  · Patient initially scheduled to fly back for to today, they were able to postpone the flight and are hoping to get better pain control today with a flight out tomorrow 2/17  · Prescriptions have been sent to local pharmacy, wife to  today 2/16  · Case management working on wheelchair obtainment    Gastroesophageal reflux disease without esophagitis  Assessment & Plan  · Protonix daily    Hypothyroidism  Assessment & Plan  · Levothyroxine daily    Stage 3 chronic kidney disease ([facility] [address] )  Assessment & Plan  · Creatinine may be falsely elevated due to high amount of muscle mass  · Avoid nephrotoxins and hypotension  · Overall stable, creatinine 1 4        VTE Pharmacologic Prophylaxis:   Pharmacologic: Other Medication: Aspirin 325 mg b i d  Mechanical VTE Prophylaxis in Place: No    Patient Centered Rounds: I have performed bedside rounds with nursing staff today      Discussions with Specialists or Other Care Team Provider:  Orthopedics, case management    Education and Discussions with Family / Patient:  Patient, wife present at bedside    Time Spent for Care: 45 minutes  More than 50% of total time spent on counseling and coordination of care as described above  Current Length of Stay: 3 day(s)    Current Patient Status: Inpatient   Certification Statement: The patient will continue to require additional inpatient hospital stay due to Better pain control, anticipate discharge home tomorrow    Discharge Plan:  Patient has a flight out of [place] to [place]    Code Status: Level 1 - Full Code      Subjective:   Patient seen this morning, his night actually went okay, he is having severe burning in the foot again this morning now  He had an episode yesterday evening where he felt that the foot was swelling outside of the stirrup splint  Patient compliant with 100% nonweightbearing status, utilizing crutches to get to the bathroom at this time  Has not had a bowel movement but passing good flatus  He understands utilize the MiraLax if no bowel movement by this afternoon  Long conversation with the patient as well as his wife regarding the risk of DVT  Also discussed the risk of uncontrolled pain with the pressure changes of the flight  Patient confident that if his pain control is better the next 24 hours, he will be able to tolerate the flight  He understands that he needs to wear compression stocking on the right leg, and ambulate with his weight-bearing status before they drive 2 hours after the 2 hour flight  Objective:     Vitals:   Temp (24hrs), Av 9 °F (37 2 °C), Min:98 8 °F (37 1 °C), Max:98 9 °F (37 2 °C)    Temp:  [98 8 °F (37 1 °C)-98 9 °F (37 2 °C)] 98 8 °F (37 1 °C)  HR:  [109-111] 109  Resp:  [10-18] 18  BP: (115-137)/(77-78) 131/78  SpO2:  [97 %] 97 %  There is no height or weight on file to calculate BMI  Input and Output Summary (last 24 hours):        Intake/Output Summary (Last 24 hours) at 2020 1156  Last data filed at 2020 0300  Gross per 24 hour   Intake 360 ml   Output 1025 ml   Net -665 ml Physical Exam:     Physical Exam   Constitutional: Vital signs are normal  He appears well-developed and well-nourished  No distress  Cardiovascular: Normal rate, regular rhythm, S1 normal, S2 normal and normal heart sounds  No murmur heard  Pulmonary/Chest: Effort normal and breath sounds normal  No respiratory distress  He has no wheezes  He has no rhonchi  He has no rales  Abdominal: Soft  Bowel sounds are normal  He exhibits no distension  There is no tenderness  Musculoskeletal: He exhibits deformity (Left lower extremity, external dressing is clean/dry/intact;)  He exhibits no edema  Neurological:   Neurovascular intact to the toes   Psychiatric: He has a normal mood and affect  Nursing note and vitals reviewed  Additional Data:     Labs:    Results from last 7 days   Lab Units 02/15/20  0543  02/13/20  2333   WBC Thousand/uL 13 57*   < > 12 44*   HEMOGLOBIN g/dL 12 4   < > 15 0   HEMATOCRIT % 38 6   < > 46 2   PLATELETS Thousands/uL 159   < > 184   NEUTROS PCT %  --   --  81*   LYMPHS PCT %  --   --  12*   MONOS PCT %  --   --  6   EOS PCT %  --   --  1    < > = values in this interval not displayed  Results from last 7 days   Lab Units 02/16/20  0440   SODIUM mmol/L 141   POTASSIUM mmol/L 3 9   CHLORIDE mmol/L 104   CO2 mmol/L 33*   BUN mg/dL 13   CREATININE mg/dL 1 40*   ANION GAP mmol/L 4   CALCIUM mg/dL 8 1*   GLUCOSE RANDOM mg/dL 109                           * I Have Reviewed All Lab Data Listed Above  * Additional Pertinent Lab Tests Reviewed:  All Labs Within Last 24 Hours Reviewed    Imaging:    Imaging Reports Reviewed Today Include:   Imaging Personally Reviewed by Myself Includes:      Recent Cultures (last 7 days):           Last 24 Hours Medication List:     Current Facility-Administered Medications:  acetaminophen 975 mg Oral Q8H Boris Giraldo MD   aluminum-magnesium hydroxide-simethicone 30 mL Oral Q6H PRN Link Li MD   aspirin 325 mg Oral Q12H [facility] Livan Choudhary PA-C   dextran 70-hypromellose 1 drop Both Eyes Q3H PRN Alfonso Taylor PA-C   docusate sodium 100 mg Oral BID Mary Choudhary PA-C   famotidine 20 mg Oral BID PRN Mary Choudhary PA-C   gabapentin 200 mg Oral Q8H Mary Choudhary PA-C   HYDROmorphone 1 mg Intravenous Q3H PRN Christo Gordon PA-C   HYDROmorphone 2 mg Oral Q3H PRN Alfonso Taylor PA-C   Or       HYDROmorphone 1 mg Oral Q3H PRN Alfonso Taylor PA-C   levothyroxine 50 mcg Oral Early Morning Erma Flowers MD   ondansetron 4 mg Intravenous Q6H PRN Christian Crocker MD   pantoprazole 40 mg Oral Early Morning Christian Crocker MD   polyethylene glycol 17 g Oral Daily PRN Alfonso Taylor PA-C        Today, Patient Was Seen By: Alfonso Taylor PA-C    ** Please Note: Dictation voice to text software may have been used in the creation of this document   **

## 2020-02-17 ENCOUNTER — APPOINTMENT (INPATIENT)
Dept: RADIOLOGY | Facility: HOSPITAL | Age: 45
DRG: 494 | End: 2020-02-17
Payer: COMMERCIAL

## 2020-02-17 VITALS
BODY MASS INDEX: 30.48 KG/M2 | RESPIRATION RATE: 16 BRPM | HEIGHT: 72 IN | TEMPERATURE: 99 F | DIASTOLIC BLOOD PRESSURE: 83 MMHG | SYSTOLIC BLOOD PRESSURE: 126 MMHG | WEIGHT: 225 LBS | OXYGEN SATURATION: 97 % | HEART RATE: 104 BPM

## 2020-02-17 PROCEDURE — 99239 HOSP IP/OBS DSCHRG MGMT >30: CPT | Performed by: NURSE PRACTITIONER

## 2020-02-17 PROCEDURE — 99024 POSTOP FOLLOW-UP VISIT: CPT | Performed by: PHYSICIAN ASSISTANT

## 2020-02-17 PROCEDURE — 73620 X-RAY EXAM OF FOOT: CPT

## 2020-02-17 RX ORDER — BISACODYL 10 MG
10 SUPPOSITORY, RECTAL RECTAL DAILY PRN
Status: DISCONTINUED | OUTPATIENT
Start: 2020-02-17 | End: 2020-02-17 | Stop reason: HOSPADM

## 2020-02-17 RX ADMIN — HYDROMORPHONE HYDROCHLORIDE 2 MG: 2 TABLET ORAL at 11:25

## 2020-02-17 RX ADMIN — PANTOPRAZOLE SODIUM 40 MG: 40 TABLET, DELAYED RELEASE ORAL at 05:29

## 2020-02-17 RX ADMIN — HYDROMORPHONE HYDROCHLORIDE 2 MG: 2 TABLET ORAL at 08:23

## 2020-02-17 RX ADMIN — ACETAMINOPHEN 975 MG: 325 TABLET, FILM COATED ORAL at 12:34

## 2020-02-17 RX ADMIN — HYDROMORPHONE HYDROCHLORIDE 2 MG: 2 TABLET ORAL at 03:03

## 2020-02-17 RX ADMIN — LEVOTHYROXINE SODIUM 50 MCG: 50 TABLET ORAL at 05:29

## 2020-02-17 RX ADMIN — ACETAMINOPHEN 975 MG: 325 TABLET, FILM COATED ORAL at 05:29

## 2020-02-17 RX ADMIN — ASPIRIN 325 MG ORAL TABLET 325 MG: 325 PILL ORAL at 08:24

## 2020-02-17 RX ADMIN — DOCUSATE SODIUM 100 MG: 100 CAPSULE, LIQUID FILLED ORAL at 08:24

## 2020-02-17 RX ADMIN — GABAPENTIN 200 MG: 100 CAPSULE ORAL at 12:34

## 2020-02-17 RX ADMIN — ENOXAPARIN SODIUM 40 MG: 40 INJECTION SUBCUTANEOUS at 11:12

## 2020-02-17 RX ADMIN — GABAPENTIN 200 MG: 100 CAPSULE ORAL at 05:28

## 2020-02-17 NOTE — PROGRESS NOTES
Orthopedics   José Bernard 53 y o  male MRN: [id number]  Unit/Bed#: -01    Subjective:  53 y o male post operative day 3 left tibial intramedullary nail and ORIF left mid to distal fibula  Patient feeling better today  Pain well controlled  Still has numbness on the top of his foot and toes  States he feels he has difficulty moving the 2nd toe  He also still has numbness lateral aspect of the leg  Patient's plan is to be discharged today to fly back home to [place]      Labs:  0   Lab Value Date/Time    HCT 38 6 02/15/2020 0543    HCT 43 7 02/14/2020 0412    HCT 46 2 02/13/2020 2333    HGB 12 4 02/15/2020 0543    HGB 14 0 02/14/2020 0412    HGB 15 0 02/13/2020 2333    WBC 13 57 (H) 02/15/2020 0543    WBC 9 82 02/14/2020 0412    WBC 12 44 (H) 02/13/2020 2333       Meds:    Current Facility-Administered Medications:     acetaminophen (TYLENOL) tablet 975 mg, 975 mg, Oral, Q8H [address], Viridiana Smith MD, 975 mg at 02/17/20 0529    aluminum-magnesium hydroxide-simethicone (MYLANTA) 200-200-20 mg/5 mL oral suspension 30 mL, 30 mL, Oral, Q6H PRN, Viridiana Smith MD    aspirin tablet 325 mg, 325 mg, Oral, Q12H [address], Mary Choudhary PA-C, 325 mg at 02/16/20 2159    dextran 70-hypromellose (GENTEAL TEARS) 0 1-0 3 % ophthalmic solution 1 drop, 1 drop, Both Eyes, Q3H PRN, Mary Choudhary PA-C, 1 drop at 02/15/20 1307    docusate sodium (COLACE) capsule 100 mg, 100 mg, Oral, BID, Mary Choudhary PA-C, 100 mg at 02/16/20 1704    famotidine (PEPCID) tablet 20 mg, 20 mg, Oral, BID PRN, Mary Choudhary PA-C    gabapentin (NEURONTIN) capsule 200 mg, 200 mg, Oral, Q8H, Mary Choudhary PA-C, 200 mg at 02/17/20 0528    HYDROmorphone (DILAUDID) injection 1 mg, 1 mg, Intravenous, Q3H PRN, Candice Dempsey PA-C, 1 mg at 02/16/20 2000    HYDROmorphone (DILAUDID) tablet 1 mg, 1 mg, Oral, Q3H PRN **OR** HYDROmorphone (DILAUDID) tablet 2 mg, 2 mg, Oral, Q3H PRN, 2 mg at 02/17/20 0303 **OR** [DISCONTINUED] HYDROmorphone (DILAUDID) injection 1 mg, 1 mg, Intravenous, Q3H PRN, Mary Choudhary PA-C, 1 mg at 02/15/20 1643    levothyroxine tablet 50 mcg, 50 mcg, Oral, Early Morning, Kapil Flowers MD, 50 mcg at 02/17/20 0529    ondansetron (ZOFRAN) injection 4 mg, 4 mg, Intravenous, Q6H PRN, Jeremy Galvez MD    pantoprazole (PROTONIX) EC tablet 40 mg, 40 mg, Oral, Early Morning, Kapil Flowers MD, 40 mg at 02/17/20 0529    polyethylene glycol (MIRALAX) packet 17 g, 17 g, Oral, Daily PRN, Mary Choudhary PA-C, 17 g at 02/16/20 1329    Blood Culture:   No results found for: BLOODCX    Wound Culture:   No results found for: WOUNDCULT    Ins and Outs:  I/O last 24 hours: In: 1200 [P O :1200]  Out: 1600 [Urine:1600]          Physical:  Vitals:    02/17/20 0723   BP: 126/83   Pulse: (!) 122   Resp: 16   Temp: 99 °F (37 2 °C)   SpO2: 97%     left lower extremity  · Dressing today is clean dry and intact with no evidence of soilage  · Posterior and stirrup splint remain in place  · Patient is able to flex and extend knee  · Sensation is intact to light touch left lower extremity with the exception top of the whole foot and toes as well as lateral leg over incision    Assessment:   44 y o male POD #3 tibial intramedullary nail and ORIF mid to distal fibula  Discharge planning today to fly back home to [place]  Patient will take 325 mg aspirin twice daily for the next week and then once a day for a total of 30 days from date of surgery  Plan:  · NWB left lower extremity with stirrup and posterior splint  · PT/OT continue treatment  · Pain medication as needed  · Up and out of bed  · Recommend patient take 325 mg aspirin twice daily for one week and then once daily for total of 30 days from date of surgery  · Patient will follow up with an orthopedist in [place] for all postoperative care which is where he lives    Please call with any questions      Adama Yugn PA-C

## 2020-02-17 NOTE — OP NOTE
OPERATIVE REPORT    Patient Name: Paul Nguyen    :  1975  MRN: [id number]  Pt Location: [facility] OR ROOM 04    Surgery Date:   2020    Surgeon(s) and Role:  Marcela Lozada MD - Primary  Hailey Blanco PA-C - Assisting  Aria Trujillo PA-C - Assisting    Preop Diagnosis:  Closed comminuted fractures of shaft of tibia and fibula, left    Post-Op Diagnosis:  Closed fracture of shaft of tibia and fibula, left    Procedure(s) (LRB):  INSERTION NAIL IM TIBIA; ORIF mid distal fibula (Left)    Specimen(s):  None    Estimated Blood Loss:   800 mL    Drains:  None    Implants:   Implant Name  Lot No  LRB No  Used   NAIL 9MM TIB TC TI EX W/PROX BEND 345MM STRL - [id number] [employer] [id number] Left 1   SCREW LCK 4 X 36MM T25 STRDRV RECES STRL - [id number] [facility] [id number] Left 1   SCREW LCK 4 X 40MM T25 STRDRV RECES STRL - [id number] [facility] [id number] Left 1   SCREW LCK 4 X 38MM T25 STRDRV RECESS STRL - [id number] [facility] [id number] Left 1   SCREW LCK 4 X 44MM T25 STRDRV RECES STRL - [id number] [facility] [id number] Left 1   SCREW CRTX 3 5 X 12MM SLF TAP SS - [id number] [facility]  Left 1   SCREW CRTX 3 5 X 14MM SS SLF TAP - [id number] [facility]  Left 3   SCREW CRTX 3 5 X 16MM SLF TAP SS - [id number] [facility]  Left 1   PLATE LCP 1/3 TUBL 16GV 7HL - [id number] [facility]  Left 1       Anesthesia Type:   General    Operative Indications:  15-year-old male status post skiing injury  Patient suffered closed comminuted fractures of shafts of the left tibia and fibula  Fractures extended from mid to distal 3rd of the diaphysis  Fibular fracture with and 3 to 4 cm of the ankle joint  Tibia fracture and at approximately 5 cm above ankle joint  Risks and benefits of intramedullary nailing of the tibia fractures and ORIF of the fibular fractures were discussed in detail and patient wished to proceed  All questions answered  Operative Findings:  Severely comminuted fractures of tibia and fibula shafts    Excellent closed reduction of tibiotalar and with open reduction the fibular fracture and gravity traction over a triangle  Complications:   None    Procedure and Technique:  Patient's left leg was marked and risks and benefits of procedure reviewed in the preop area  All questions answered  Patient wished to proceed  He received prophylactic 2 g IV Ancef  He was taken to the operating room  Identified  General anesthesia administered  Placed on the OR table in the supine position  Bump under the left buttock  Tourniquet placed on the left thigh but not inflated  Wolfe catheter placed  Time-out performed  We then placed the left lower extremity over a triangle  This did not reduce the fibula fracture  The fibular fracture was very comminuted and closed left ankle joint that we wanted to fix this with a bridge plate to better align it  We felt this would help determine rotation and alignment of tibia fracture as well  It did appear that we would be able to use the proximal and distal segments of the fibula to help determine length and possibly rotation  Leg was removed from the triangle  Incision was made from couple cm proximal to the fracture to a couple cm distal along the posterior aspect of the fibula  Incision made just to subcutaneous tissue and then Metzenbaum dissection performed down to bone to avoid any injury to branches of superficial peroneal nerve  Fractures post   This was a very comminuted fracture and unstable  There were 2 butterfly fragments that we left the soft tissue connections on  They were too small to truly incorporate into fracture fixation  We were able to approximate and hold the distal and proximal segments posteriorly and fashioned a bridge plate  1/3 semi tubular 7 hole  This was fixated with 3 bicortical screws proximally and 2 distally  We considered a 3rd screwed distally but plate was not quite long enough    As this was a bridge plate and the fibula would be splinted by the tibia and patient would be nonweightbearing for a long period of time we accepted the 2 screws distally  The 2 larger butterfly fragments were then sutured with 2 cerclage sutures and to the fracture site and the plate  The leg was then placed back onto the triangle  Now after some gentle manipulation we did have excellent reduction of the tibia fracture with gravity traction  An incision was made in line with the medullary canal from the distal 3rd of the patella along the patellar ligament down to the tibial tuberosity  The patella sheath incised and then infrapatellar fat pad was dissected and retracted exposing the insertion point for the nail  This was on the ventral edge of the tibial plateau in line with the tibial canal   Guidewire inserted approximately 8 cm  Position checked with fluoro  Drill and drill sleeve introduced over the guidewire and canal drilled for 8 cm with the 12 mm cannulated drill bit  A ball-tip guidewire then introduced down the tibial canal to the scar above the ankle  I could guidewire measured and we determined that a 345 mm nail length was appropriate  Tibia reamed up to a 10 mm Reamer and a 9 mm 345 mm long tibial cannulated GI EXTR nail introduced over the guidewire and advanced down the tibia to just above the ankle joint it at approximately the metaphyseal scar  Fluoro views confirmed excellent reduction with the nail in place  Good rotation of the leg was noted was noted  Then using the proximal locking guide 2 mediolateral locking screws were placed through stab incisions with hemostat dissection down to bone  The 1st was placed in the static distal static hole and the 2nd in the dynamic hole position  Next 2 distal locking screws were placed using perfect Jamul technique  Once perfect Jamul of fluoro views obtained small 1 cm incision made with hemostat dissection down to bone    Drill centered over the screw hole and then [facility] retractor placed and drill advanced bicortical drilling performed and then length of screw required obtained using depth gauge  Two 4 0 mm screws used for locking  Final fluoro views confirmed excellent position of the nail and excellent reduction of the comminuted tibial fracture  Wounds were irrigated and closed in layers  Dry sterile dressings applied  Posterior and U splints applied  Patient tolerated procedure well  No complications  Estimated blood loss was 800 cc  Patient went to recovery in stable condition  Cinthia Adamson PA-C assisted through the 2nd half of the surgery and KAE Canseco assisted through the 1st step of the surgery  They provided help with fracture reduction and exposure  I was present for the entire procedure    Patient Disposition:  PACU     SIGNATURE: Jelani Headley MD  DATE: February 17, 2020  TIME: 8:02 AM      Portions of the record may have been created with voice recognition software   Occasional wrong word or "sound a like" substitutions may have occurred due to the inherent limitations of voice recognition software   Read the chart carefully and recognize, using context, where substitutions have occurred

## (undated) DEVICE — OCCLUSIVE GAUZE STRIP,3% BISMUTH TRIBROMOPHENATE IN PETROLATUM BLEND: Brand: XEROFORM

## (undated) DEVICE — 3.2MM THREE-FLUTED DRILL BIT QC/NEEDLE POINT/145MM

## (undated) DEVICE — DRAPE C-ARMOUR

## (undated) DEVICE — ABDOMINAL PAD: Brand: DERMACEA

## (undated) DEVICE — DRAPE C-ARM X-RAY

## (undated) DEVICE — 2.5MM REAMING ROD WITH BALL TIP/950MM-STERILE

## (undated) DEVICE — GAUZE SPONGES,16 PLY: Brand: CURITY

## (undated) DEVICE — BULB SYRINGE,IRRIGATION WITH PROTECTIVE CAP: Brand: DOVER

## (undated) DEVICE — GAUZE SPONGES,USP TYPE VII GAUZE, 12 PLY: Brand: CURITY

## (undated) DEVICE — 3.2MM THREE-FLUTED DRILL BIT QC/330MM/100MM CALIBRATION

## (undated) DEVICE — GLOVE INDICATOR PI UNDERGLOVE SZ 9 BLUE

## (undated) DEVICE — LIGHT HANDLE COVER SLEEVE DISP BLUE STELLAR

## (undated) DEVICE — SUT VICRYL 0 CT-1 27 IN J260H

## (undated) DEVICE — ACE WRAP 6 IN UNSTERILE

## (undated) DEVICE — CHLORAPREP HI-LITE 26ML ORANGE

## (undated) DEVICE — GLOVE SRG BIOGEL 9

## (undated) DEVICE — BANDAGE, ESMARK LF STR 6"X9' (20/CS): Brand: CYPRESS

## (undated) DEVICE — 2.5MM DRILL BIT/QC/GOLD/110MM

## (undated) DEVICE — IMPERVIOUS STOCKINETTE: Brand: DEROYAL

## (undated) DEVICE — 3.2MM GUIDE WIRE 400MM

## (undated) DEVICE — INTENDED FOR TISSUE SEPARATION, AND OTHER PROCEDURES THAT REQUIRE A SHARP SURGICAL BLADE TO PUNCTURE OR CUT.: Brand: BARD-PARKER ® CARBON RIB-BACK BLADES

## (undated) DEVICE — SUT VICRYL 2-0 CT-1 27 IN J259H

## (undated) DEVICE — STD TOTAL KNEE PLUS II PACK: Brand: CARDINAL HEALTH

## (undated) DEVICE — DRAPE EQUIPMENT RF WAND

## (undated) DEVICE — PAD GROUNDING ADULT

## (undated) DEVICE — ASTOUND SURGICAL GOWN, XXX LARGE, X-LONG: Brand: CONVERTORS

## (undated) DEVICE — PADDING CAST 4 IN  COTTON STRL